# Patient Record
Sex: MALE | Race: WHITE | Employment: OTHER | URBAN - METROPOLITAN AREA
[De-identification: names, ages, dates, MRNs, and addresses within clinical notes are randomized per-mention and may not be internally consistent; named-entity substitution may affect disease eponyms.]

---

## 2017-12-22 PROBLEM — I46.9 ASYSTOLE (HCC): Status: ACTIVE | Noted: 2017-12-22

## 2017-12-23 PROBLEM — I25.10 CAD (CORONARY ARTERY DISEASE): Status: ACTIVE | Noted: 2017-12-23

## 2019-02-08 ENCOUNTER — HOSPITAL ENCOUNTER (OUTPATIENT)
Dept: CARDIAC REHAB | Age: 74
Setting detail: THERAPIES SERIES
Discharge: HOME | End: 2019-02-08
Payer: OTHER GOVERNMENT

## 2019-02-25 ENCOUNTER — HOSPITAL ENCOUNTER (OUTPATIENT)
Dept: CARDIAC REHAB | Age: 74
Setting detail: THERAPIES SERIES
Discharge: HOME | End: 2019-02-25
Payer: OTHER GOVERNMENT

## 2019-02-25 LAB
GLUCOSE BLD-MCNC: 171 MG/DL (ref 70–99)
PERFORMED ON: ABNORMAL

## 2019-02-25 PROCEDURE — 93798 PHYS/QHP OP CAR RHAB W/ECG: CPT

## 2019-02-25 RX ORDER — WARFARIN SODIUM 5 MG/1
5 TABLET ORAL
COMMUNITY
End: 2019-12-30 | Stop reason: ALTCHOICE

## 2019-02-27 ENCOUNTER — HOSPITAL ENCOUNTER (OUTPATIENT)
Dept: CARDIAC REHAB | Age: 74
Setting detail: THERAPIES SERIES
Discharge: HOME | End: 2019-02-27
Payer: OTHER GOVERNMENT

## 2019-02-27 PROCEDURE — 93798 PHYS/QHP OP CAR RHAB W/ECG: CPT

## 2019-03-01 ENCOUNTER — HOSPITAL ENCOUNTER (OUTPATIENT)
Dept: CARDIAC REHAB | Age: 74
Setting detail: THERAPIES SERIES
Discharge: HOME | End: 2019-03-01
Payer: OTHER GOVERNMENT

## 2019-03-01 PROCEDURE — 93798 PHYS/QHP OP CAR RHAB W/ECG: CPT

## 2019-03-04 ENCOUNTER — HOSPITAL ENCOUNTER (OUTPATIENT)
Dept: CARDIAC REHAB | Age: 74
Setting detail: THERAPIES SERIES
Discharge: HOME | End: 2019-03-04
Payer: OTHER GOVERNMENT

## 2019-03-08 ENCOUNTER — HOSPITAL ENCOUNTER (OUTPATIENT)
Dept: CARDIAC REHAB | Age: 74
Setting detail: THERAPIES SERIES
Discharge: HOME | End: 2019-03-08
Payer: OTHER GOVERNMENT

## 2019-03-08 PROCEDURE — 93798 PHYS/QHP OP CAR RHAB W/ECG: CPT

## 2019-03-11 ENCOUNTER — HOSPITAL ENCOUNTER (OUTPATIENT)
Dept: CARDIAC REHAB | Age: 74
Setting detail: THERAPIES SERIES
Discharge: HOME | End: 2019-03-11
Payer: OTHER GOVERNMENT

## 2019-03-11 LAB
GLUCOSE BLD-MCNC: 162 MG/DL (ref 70–99)
PERFORMED ON: ABNORMAL

## 2019-03-11 PROCEDURE — 93798 PHYS/QHP OP CAR RHAB W/ECG: CPT

## 2019-03-13 ENCOUNTER — HOSPITAL ENCOUNTER (OUTPATIENT)
Dept: CARDIAC REHAB | Age: 74
Setting detail: THERAPIES SERIES
Discharge: HOME | End: 2019-03-13
Payer: OTHER GOVERNMENT

## 2019-03-13 LAB
GLUCOSE BLD-MCNC: 160 MG/DL (ref 70–99)
PERFORMED ON: ABNORMAL

## 2019-03-13 PROCEDURE — 93798 PHYS/QHP OP CAR RHAB W/ECG: CPT

## 2019-03-18 ENCOUNTER — HOSPITAL ENCOUNTER (OUTPATIENT)
Dept: CARDIAC REHAB | Age: 74
Setting detail: THERAPIES SERIES
Discharge: HOME | End: 2019-03-18
Payer: OTHER GOVERNMENT

## 2019-03-18 PROCEDURE — 93798 PHYS/QHP OP CAR RHAB W/ECG: CPT

## 2019-03-20 ENCOUNTER — HOSPITAL ENCOUNTER (OUTPATIENT)
Dept: CARDIAC REHAB | Age: 74
Setting detail: THERAPIES SERIES
Discharge: HOME | End: 2019-03-20
Payer: OTHER GOVERNMENT

## 2019-03-20 PROCEDURE — 93798 PHYS/QHP OP CAR RHAB W/ECG: CPT

## 2019-03-25 ENCOUNTER — APPOINTMENT (OUTPATIENT)
Dept: CARDIAC REHAB | Age: 74
End: 2019-03-25
Payer: OTHER GOVERNMENT

## 2019-03-27 ENCOUNTER — APPOINTMENT (OUTPATIENT)
Dept: CARDIAC REHAB | Age: 74
End: 2019-03-27
Payer: OTHER GOVERNMENT

## 2019-03-29 ENCOUNTER — APPOINTMENT (OUTPATIENT)
Dept: CARDIAC REHAB | Age: 74
End: 2019-03-29
Payer: OTHER GOVERNMENT

## 2019-12-30 ENCOUNTER — HOSPITAL ENCOUNTER (OUTPATIENT)
Dept: CARDIAC REHAB | Age: 74
Setting detail: THERAPIES SERIES
Discharge: HOME | End: 2019-12-30
Payer: OTHER GOVERNMENT

## 2019-12-30 RX ORDER — INSULIN GLARGINE 100 [IU]/ML
60 INJECTION, SOLUTION SUBCUTANEOUS DAILY
COMMUNITY

## 2019-12-30 RX ORDER — CLOPIDOGREL BISULFATE 75 MG/1
75 TABLET ORAL DAILY
COMMUNITY

## 2019-12-30 RX ORDER — FUROSEMIDE 20 MG/1
20 TABLET ORAL DAILY
Status: ON HOLD | COMMUNITY
End: 2020-12-05

## 2019-12-30 RX ORDER — FERROUS SULFATE 325(65) MG
325 TABLET ORAL
COMMUNITY

## 2020-01-03 ENCOUNTER — HOSPITAL ENCOUNTER (OUTPATIENT)
Dept: CARDIAC REHAB | Age: 75
Setting detail: THERAPIES SERIES
Discharge: HOME | End: 2020-01-03
Payer: OTHER GOVERNMENT

## 2020-01-03 LAB
GLUCOSE BLD-MCNC: 254 MG/DL (ref 70–99)
GLUCOSE BLD-MCNC: 306 MG/DL (ref 70–99)
PERFORMED ON: ABNORMAL
PERFORMED ON: ABNORMAL

## 2020-01-03 PROCEDURE — 93798 PHYS/QHP OP CAR RHAB W/ECG: CPT

## 2020-01-06 ENCOUNTER — HOSPITAL ENCOUNTER (OUTPATIENT)
Dept: CARDIAC REHAB | Age: 75
Setting detail: THERAPIES SERIES
Discharge: HOME | End: 2020-01-06
Payer: OTHER GOVERNMENT

## 2020-01-06 LAB
GLUCOSE BLD-MCNC: 236 MG/DL (ref 70–99)
PERFORMED ON: ABNORMAL

## 2020-01-06 PROCEDURE — 93798 PHYS/QHP OP CAR RHAB W/ECG: CPT

## 2020-01-08 ENCOUNTER — APPOINTMENT (OUTPATIENT)
Dept: CARDIAC REHAB | Age: 75
End: 2020-01-08
Payer: OTHER GOVERNMENT

## 2020-01-08 ENCOUNTER — HOSPITAL ENCOUNTER (OUTPATIENT)
Dept: CARDIAC REHAB | Age: 75
Setting detail: THERAPIES SERIES
Discharge: HOME | End: 2020-01-08
Payer: OTHER GOVERNMENT

## 2020-01-08 PROCEDURE — 93798 PHYS/QHP OP CAR RHAB W/ECG: CPT

## 2020-01-10 ENCOUNTER — APPOINTMENT (OUTPATIENT)
Dept: CARDIAC REHAB | Age: 75
End: 2020-01-10
Payer: OTHER GOVERNMENT

## 2020-01-13 ENCOUNTER — APPOINTMENT (OUTPATIENT)
Dept: CARDIAC REHAB | Age: 75
End: 2020-01-13
Payer: OTHER GOVERNMENT

## 2020-01-13 ENCOUNTER — HOSPITAL ENCOUNTER (OUTPATIENT)
Dept: CARDIAC REHAB | Age: 75
Setting detail: THERAPIES SERIES
Discharge: HOME | End: 2020-01-13
Payer: OTHER GOVERNMENT

## 2020-01-13 PROCEDURE — 93798 PHYS/QHP OP CAR RHAB W/ECG: CPT

## 2020-01-14 LAB
GLUCOSE BLD-MCNC: 235 MG/DL (ref 70–99)
PERFORMED ON: ABNORMAL

## 2020-01-15 ENCOUNTER — HOSPITAL ENCOUNTER (OUTPATIENT)
Dept: CARDIAC REHAB | Age: 75
Setting detail: THERAPIES SERIES
Discharge: HOME | End: 2020-01-15
Payer: OTHER GOVERNMENT

## 2020-01-15 ENCOUNTER — APPOINTMENT (OUTPATIENT)
Dept: CARDIAC REHAB | Age: 75
End: 2020-01-15
Payer: OTHER GOVERNMENT

## 2020-01-15 PROCEDURE — 93798 PHYS/QHP OP CAR RHAB W/ECG: CPT

## 2020-01-17 ENCOUNTER — APPOINTMENT (OUTPATIENT)
Dept: CARDIAC REHAB | Age: 75
End: 2020-01-17
Payer: OTHER GOVERNMENT

## 2020-01-17 ENCOUNTER — HOSPITAL ENCOUNTER (OUTPATIENT)
Dept: CARDIAC REHAB | Age: 75
Setting detail: THERAPIES SERIES
Discharge: HOME | End: 2020-01-17
Payer: OTHER GOVERNMENT

## 2020-01-17 PROCEDURE — 93798 PHYS/QHP OP CAR RHAB W/ECG: CPT

## 2020-01-20 ENCOUNTER — HOSPITAL ENCOUNTER (OUTPATIENT)
Dept: CARDIAC REHAB | Age: 75
Setting detail: THERAPIES SERIES
Discharge: HOME | End: 2020-01-20
Payer: OTHER GOVERNMENT

## 2020-01-20 ENCOUNTER — APPOINTMENT (OUTPATIENT)
Dept: CARDIAC REHAB | Age: 75
End: 2020-01-20
Payer: OTHER GOVERNMENT

## 2020-01-20 LAB
GLUCOSE BLD-MCNC: 229 MG/DL (ref 70–99)
GLUCOSE BLD-MCNC: 242 MG/DL (ref 70–99)
PERFORMED ON: ABNORMAL
PERFORMED ON: ABNORMAL

## 2020-01-20 PROCEDURE — 93798 PHYS/QHP OP CAR RHAB W/ECG: CPT

## 2020-01-22 ENCOUNTER — HOSPITAL ENCOUNTER (OUTPATIENT)
Dept: CARDIAC REHAB | Age: 75
Setting detail: THERAPIES SERIES
Discharge: HOME | End: 2020-01-22
Payer: OTHER GOVERNMENT

## 2020-01-22 ENCOUNTER — APPOINTMENT (OUTPATIENT)
Dept: CARDIAC REHAB | Age: 75
End: 2020-01-22
Payer: OTHER GOVERNMENT

## 2020-01-22 PROCEDURE — 93798 PHYS/QHP OP CAR RHAB W/ECG: CPT

## 2020-01-24 ENCOUNTER — APPOINTMENT (OUTPATIENT)
Dept: CARDIAC REHAB | Age: 75
End: 2020-01-24
Payer: OTHER GOVERNMENT

## 2020-01-24 ENCOUNTER — HOSPITAL ENCOUNTER (OUTPATIENT)
Dept: CARDIAC REHAB | Age: 75
Setting detail: THERAPIES SERIES
Discharge: HOME | End: 2020-01-24
Payer: OTHER GOVERNMENT

## 2020-01-24 PROCEDURE — 93798 PHYS/QHP OP CAR RHAB W/ECG: CPT

## 2020-01-27 ENCOUNTER — APPOINTMENT (OUTPATIENT)
Dept: CARDIAC REHAB | Age: 75
End: 2020-01-27
Payer: OTHER GOVERNMENT

## 2020-01-27 ENCOUNTER — HOSPITAL ENCOUNTER (OUTPATIENT)
Dept: CARDIAC REHAB | Age: 75
Setting detail: THERAPIES SERIES
Discharge: HOME | End: 2020-01-27
Payer: OTHER GOVERNMENT

## 2020-01-29 ENCOUNTER — APPOINTMENT (OUTPATIENT)
Dept: CARDIAC REHAB | Age: 75
End: 2020-01-29
Payer: OTHER GOVERNMENT

## 2020-01-31 ENCOUNTER — HOSPITAL ENCOUNTER (OUTPATIENT)
Dept: CARDIAC REHAB | Age: 75
Setting detail: THERAPIES SERIES
Discharge: HOME | End: 2020-01-31
Payer: OTHER GOVERNMENT

## 2020-01-31 ENCOUNTER — APPOINTMENT (OUTPATIENT)
Dept: CARDIAC REHAB | Age: 75
End: 2020-01-31
Payer: OTHER GOVERNMENT

## 2020-01-31 PROCEDURE — 93798 PHYS/QHP OP CAR RHAB W/ECG: CPT

## 2020-02-03 ENCOUNTER — APPOINTMENT (OUTPATIENT)
Dept: CARDIAC REHAB | Age: 75
End: 2020-02-03
Payer: MEDICARE

## 2020-02-03 ENCOUNTER — HOSPITAL ENCOUNTER (OUTPATIENT)
Dept: CARDIAC REHAB | Age: 75
Setting detail: THERAPIES SERIES
Discharge: HOME | End: 2020-02-03
Payer: OTHER GOVERNMENT

## 2020-02-03 LAB
GLUCOSE BLD-MCNC: 237 MG/DL (ref 70–99)
PERFORMED ON: ABNORMAL

## 2020-02-03 PROCEDURE — 93798 PHYS/QHP OP CAR RHAB W/ECG: CPT

## 2020-02-05 ENCOUNTER — APPOINTMENT (OUTPATIENT)
Dept: CARDIAC REHAB | Age: 75
End: 2020-02-05
Payer: MEDICARE

## 2020-02-07 ENCOUNTER — HOSPITAL ENCOUNTER (OUTPATIENT)
Dept: CARDIAC REHAB | Age: 75
Setting detail: THERAPIES SERIES
Discharge: HOME | End: 2020-02-07
Payer: OTHER GOVERNMENT

## 2020-02-07 ENCOUNTER — APPOINTMENT (OUTPATIENT)
Dept: CARDIAC REHAB | Age: 75
End: 2020-02-07
Payer: MEDICARE

## 2020-02-07 PROCEDURE — 93798 PHYS/QHP OP CAR RHAB W/ECG: CPT

## 2020-02-10 ENCOUNTER — APPOINTMENT (OUTPATIENT)
Dept: CARDIAC REHAB | Age: 75
End: 2020-02-10
Payer: MEDICARE

## 2020-02-12 ENCOUNTER — APPOINTMENT (OUTPATIENT)
Dept: CARDIAC REHAB | Age: 75
End: 2020-02-12
Payer: MEDICARE

## 2020-02-12 ENCOUNTER — HOSPITAL ENCOUNTER (OUTPATIENT)
Dept: CARDIAC REHAB | Age: 75
Setting detail: THERAPIES SERIES
Discharge: HOME | End: 2020-02-12
Payer: OTHER GOVERNMENT

## 2020-02-12 PROCEDURE — 93798 PHYS/QHP OP CAR RHAB W/ECG: CPT

## 2020-02-14 ENCOUNTER — APPOINTMENT (OUTPATIENT)
Dept: CARDIAC REHAB | Age: 75
End: 2020-02-14
Payer: MEDICARE

## 2020-02-17 ENCOUNTER — APPOINTMENT (OUTPATIENT)
Dept: CARDIAC REHAB | Age: 75
End: 2020-02-17
Payer: MEDICARE

## 2020-02-19 ENCOUNTER — APPOINTMENT (OUTPATIENT)
Dept: CARDIAC REHAB | Age: 75
End: 2020-02-19
Payer: MEDICARE

## 2020-02-21 ENCOUNTER — HOSPITAL ENCOUNTER (OUTPATIENT)
Dept: CARDIAC REHAB | Age: 75
Setting detail: THERAPIES SERIES
Discharge: HOME | End: 2020-02-21
Payer: OTHER GOVERNMENT

## 2020-02-21 ENCOUNTER — APPOINTMENT (OUTPATIENT)
Dept: CARDIAC REHAB | Age: 75
End: 2020-02-21
Payer: MEDICARE

## 2020-02-21 PROCEDURE — 93798 PHYS/QHP OP CAR RHAB W/ECG: CPT

## 2020-02-24 ENCOUNTER — APPOINTMENT (OUTPATIENT)
Dept: CARDIAC REHAB | Age: 75
End: 2020-02-24
Payer: MEDICARE

## 2020-02-24 ENCOUNTER — HOSPITAL ENCOUNTER (OUTPATIENT)
Dept: CARDIAC REHAB | Age: 75
Setting detail: THERAPIES SERIES
Discharge: HOME | End: 2020-02-24
Payer: OTHER GOVERNMENT

## 2020-02-24 PROCEDURE — 93798 PHYS/QHP OP CAR RHAB W/ECG: CPT

## 2020-02-26 ENCOUNTER — HOSPITAL ENCOUNTER (OUTPATIENT)
Dept: CARDIAC REHAB | Age: 75
Setting detail: THERAPIES SERIES
Discharge: HOME | End: 2020-02-26
Payer: OTHER GOVERNMENT

## 2020-02-26 ENCOUNTER — APPOINTMENT (OUTPATIENT)
Dept: CARDIAC REHAB | Age: 75
End: 2020-02-26
Payer: MEDICARE

## 2020-02-26 PROCEDURE — 93798 PHYS/QHP OP CAR RHAB W/ECG: CPT

## 2020-02-28 ENCOUNTER — APPOINTMENT (OUTPATIENT)
Dept: CARDIAC REHAB | Age: 75
End: 2020-02-28
Payer: MEDICARE

## 2020-03-02 ENCOUNTER — APPOINTMENT (OUTPATIENT)
Dept: CARDIAC REHAB | Age: 75
End: 2020-03-02
Payer: OTHER GOVERNMENT

## 2020-03-02 ENCOUNTER — HOSPITAL ENCOUNTER (OUTPATIENT)
Dept: CARDIAC REHAB | Age: 75
Setting detail: THERAPIES SERIES
Discharge: HOME | End: 2020-03-02
Payer: MEDICARE

## 2020-03-02 PROCEDURE — 93798 PHYS/QHP OP CAR RHAB W/ECG: CPT

## 2020-03-04 ENCOUNTER — APPOINTMENT (OUTPATIENT)
Dept: CARDIAC REHAB | Age: 75
End: 2020-03-04
Payer: OTHER GOVERNMENT

## 2020-03-04 ENCOUNTER — HOSPITAL ENCOUNTER (OUTPATIENT)
Dept: CARDIAC REHAB | Age: 75
Setting detail: THERAPIES SERIES
Discharge: HOME | End: 2020-03-04
Payer: MEDICARE

## 2020-03-04 PROCEDURE — 93798 PHYS/QHP OP CAR RHAB W/ECG: CPT

## 2020-03-05 LAB
GLUCOSE BLD-MCNC: 182 MG/DL (ref 70–99)
PERFORMED ON: ABNORMAL

## 2020-03-06 ENCOUNTER — APPOINTMENT (OUTPATIENT)
Dept: CARDIAC REHAB | Age: 75
End: 2020-03-06
Payer: OTHER GOVERNMENT

## 2020-03-06 ENCOUNTER — HOSPITAL ENCOUNTER (OUTPATIENT)
Dept: CARDIAC REHAB | Age: 75
Setting detail: THERAPIES SERIES
Discharge: HOME | End: 2020-03-06
Payer: MEDICARE

## 2020-03-09 ENCOUNTER — APPOINTMENT (OUTPATIENT)
Dept: CARDIAC REHAB | Age: 75
End: 2020-03-09
Payer: OTHER GOVERNMENT

## 2020-03-11 ENCOUNTER — APPOINTMENT (OUTPATIENT)
Dept: CARDIAC REHAB | Age: 75
End: 2020-03-11
Payer: OTHER GOVERNMENT

## 2020-03-13 ENCOUNTER — APPOINTMENT (OUTPATIENT)
Dept: CARDIAC REHAB | Age: 75
End: 2020-03-13
Payer: OTHER GOVERNMENT

## 2020-03-13 ENCOUNTER — HOSPITAL ENCOUNTER (OUTPATIENT)
Dept: CARDIAC REHAB | Age: 75
Setting detail: THERAPIES SERIES
Discharge: HOME | End: 2020-03-13
Payer: MEDICARE

## 2020-03-16 ENCOUNTER — HOSPITAL ENCOUNTER (OUTPATIENT)
Dept: CARDIAC REHAB | Age: 75
Setting detail: THERAPIES SERIES
Discharge: HOME | End: 2020-03-16
Payer: MEDICARE

## 2020-03-16 ENCOUNTER — APPOINTMENT (OUTPATIENT)
Dept: CARDIAC REHAB | Age: 75
End: 2020-03-16
Payer: OTHER GOVERNMENT

## 2020-03-18 ENCOUNTER — APPOINTMENT (OUTPATIENT)
Dept: CARDIAC REHAB | Age: 75
End: 2020-03-18
Payer: OTHER GOVERNMENT

## 2020-03-20 ENCOUNTER — APPOINTMENT (OUTPATIENT)
Dept: CARDIAC REHAB | Age: 75
End: 2020-03-20
Payer: MEDICARE

## 2020-03-20 ENCOUNTER — APPOINTMENT (OUTPATIENT)
Dept: CARDIAC REHAB | Age: 75
End: 2020-03-20
Payer: OTHER GOVERNMENT

## 2020-03-23 ENCOUNTER — APPOINTMENT (OUTPATIENT)
Dept: CARDIAC REHAB | Age: 75
End: 2020-03-23
Payer: OTHER GOVERNMENT

## 2020-03-23 ENCOUNTER — APPOINTMENT (OUTPATIENT)
Dept: CARDIAC REHAB | Age: 75
End: 2020-03-23
Payer: MEDICARE

## 2020-03-25 ENCOUNTER — APPOINTMENT (OUTPATIENT)
Dept: CARDIAC REHAB | Age: 75
End: 2020-03-25
Payer: MEDICARE

## 2020-03-25 ENCOUNTER — APPOINTMENT (OUTPATIENT)
Dept: CARDIAC REHAB | Age: 75
End: 2020-03-25
Payer: OTHER GOVERNMENT

## 2020-03-27 ENCOUNTER — APPOINTMENT (OUTPATIENT)
Dept: CARDIAC REHAB | Age: 75
End: 2020-03-27
Payer: OTHER GOVERNMENT

## 2020-03-27 ENCOUNTER — APPOINTMENT (OUTPATIENT)
Dept: CARDIAC REHAB | Age: 75
End: 2020-03-27
Payer: MEDICARE

## 2020-03-30 ENCOUNTER — APPOINTMENT (OUTPATIENT)
Dept: CARDIAC REHAB | Age: 75
End: 2020-03-30
Payer: MEDICARE

## 2020-03-30 ENCOUNTER — APPOINTMENT (OUTPATIENT)
Dept: CARDIAC REHAB | Age: 75
End: 2020-03-30
Payer: OTHER GOVERNMENT

## 2020-12-05 ENCOUNTER — APPOINTMENT (OUTPATIENT)
Dept: GENERAL RADIOLOGY | Age: 75
DRG: 251 | End: 2020-12-05
Payer: OTHER GOVERNMENT

## 2020-12-05 ENCOUNTER — HOSPITAL ENCOUNTER (INPATIENT)
Age: 75
LOS: 3 days | Discharge: HOME | DRG: 251 | End: 2020-12-08
Attending: EMERGENCY MEDICINE | Admitting: HOSPITALIST
Payer: OTHER GOVERNMENT

## 2020-12-05 PROBLEM — I21.4 NSTEMI (NON-ST ELEVATED MYOCARDIAL INFARCTION) (HCC): Status: ACTIVE | Noted: 2020-12-05

## 2020-12-05 LAB
A/G RATIO: 1.3 (ref 1.1–2.2)
ALBUMIN SERPL-MCNC: 3.9 G/DL (ref 3.4–5)
ALP BLD-CCNC: 99 U/L (ref 40–129)
ALT SERPL-CCNC: 14 U/L (ref 10–40)
ANION GAP SERPL CALCULATED.3IONS-SCNC: 10 MMOL/L (ref 3–16)
APTT: 39.9 SEC (ref 24.2–36.2)
APTT: 49.3 SEC (ref 24.2–36.2)
APTT: 74.4 SEC (ref 24.2–36.2)
AST SERPL-CCNC: 12 U/L (ref 15–37)
BASOPHILS ABSOLUTE: 0 K/UL (ref 0–0.2)
BASOPHILS RELATIVE PERCENT: 0.3 %
BILIRUB SERPL-MCNC: <0.2 MG/DL (ref 0–1)
BUN BLDV-MCNC: 26 MG/DL (ref 7–20)
CALCIUM SERPL-MCNC: 8.9 MG/DL (ref 8.3–10.6)
CHLORIDE BLD-SCNC: 100 MMOL/L (ref 99–110)
CO2: 25 MMOL/L (ref 21–32)
CREAT SERPL-MCNC: 1.1 MG/DL (ref 0.8–1.3)
EKG ATRIAL RATE: 66 BPM
EKG DIAGNOSIS: NORMAL
EKG P AXIS: 27 DEGREES
EKG P-R INTERVAL: 202 MS
EKG Q-T INTERVAL: 480 MS
EKG QRS DURATION: 194 MS
EKG QTC CALCULATION (BAZETT): 503 MS
EKG R AXIS: 59 DEGREES
EKG T AXIS: 226 DEGREES
EKG VENTRICULAR RATE: 66 BPM
EOSINOPHILS ABSOLUTE: 0.1 K/UL (ref 0–0.6)
EOSINOPHILS RELATIVE PERCENT: 0.5 %
ESTIMATED AVERAGE GLUCOSE: 134.1 MG/DL
GFR AFRICAN AMERICAN: >60
GFR NON-AFRICAN AMERICAN: >60
GLOBULIN: 2.9 G/DL
GLUCOSE BLD-MCNC: 110 MG/DL (ref 70–99)
GLUCOSE BLD-MCNC: 146 MG/DL (ref 70–99)
GLUCOSE BLD-MCNC: 161 MG/DL (ref 70–99)
GLUCOSE BLD-MCNC: 170 MG/DL (ref 70–99)
GLUCOSE BLD-MCNC: 187 MG/DL (ref 70–99)
HBA1C MFR BLD: 6.3 %
HCT VFR BLD CALC: 43 % (ref 40.5–52.5)
HCT VFR BLD CALC: 45.6 % (ref 40.5–52.5)
HEMOGLOBIN: 14.3 G/DL (ref 13.5–17.5)
HEMOGLOBIN: 14.8 G/DL (ref 13.5–17.5)
LYMPHOCYTES ABSOLUTE: 2 K/UL (ref 1–5.1)
LYMPHOCYTES RELATIVE PERCENT: 16.9 %
MCH RBC QN AUTO: 31.2 PG (ref 26–34)
MCH RBC QN AUTO: 31.4 PG (ref 26–34)
MCHC RBC AUTO-ENTMCNC: 32.4 G/DL (ref 31–36)
MCHC RBC AUTO-ENTMCNC: 33.3 G/DL (ref 31–36)
MCV RBC AUTO: 94.1 FL (ref 80–100)
MCV RBC AUTO: 96.2 FL (ref 80–100)
MONOCYTES ABSOLUTE: 0.6 K/UL (ref 0–1.3)
MONOCYTES RELATIVE PERCENT: 5 %
NEUTROPHILS ABSOLUTE: 9.3 K/UL (ref 1.7–7.7)
NEUTROPHILS RELATIVE PERCENT: 77.3 %
PDW BLD-RTO: 15.8 % (ref 12.4–15.4)
PDW BLD-RTO: 16.4 % (ref 12.4–15.4)
PERFORMED ON: ABNORMAL
PLATELET # BLD: 185 K/UL (ref 135–450)
PLATELET # BLD: 203 K/UL (ref 135–450)
PMV BLD AUTO: 6.4 FL (ref 5–10.5)
PMV BLD AUTO: 6.5 FL (ref 5–10.5)
POTASSIUM SERPL-SCNC: 4.8 MMOL/L (ref 3.5–5.1)
RBC # BLD: 4.57 M/UL (ref 4.2–5.9)
RBC # BLD: 4.74 M/UL (ref 4.2–5.9)
SARS-COV-2, NAAT: NOT DETECTED
SODIUM BLD-SCNC: 135 MMOL/L (ref 136–145)
TOTAL PROTEIN: 6.8 G/DL (ref 6.4–8.2)
TROPONIN: 0.02 NG/ML
TROPONIN: 0.02 NG/ML
TROPONIN: 0.05 NG/ML
WBC # BLD: 10.4 K/UL (ref 4–11)
WBC # BLD: 12.1 K/UL (ref 4–11)

## 2020-12-05 PROCEDURE — U0002 COVID-19 LAB TEST NON-CDC: HCPCS

## 2020-12-05 PROCEDURE — 97116 GAIT TRAINING THERAPY: CPT

## 2020-12-05 PROCEDURE — 93005 ELECTROCARDIOGRAM TRACING: CPT | Performed by: EMERGENCY MEDICINE

## 2020-12-05 PROCEDURE — 99223 1ST HOSP IP/OBS HIGH 75: CPT | Performed by: INTERNAL MEDICINE

## 2020-12-05 PROCEDURE — 80053 COMPREHEN METABOLIC PANEL: CPT

## 2020-12-05 PROCEDURE — 2500000003 HC RX 250 WO HCPCS: Performed by: EMERGENCY MEDICINE

## 2020-12-05 PROCEDURE — 97530 THERAPEUTIC ACTIVITIES: CPT

## 2020-12-05 PROCEDURE — 85025 COMPLETE CBC W/AUTO DIFF WBC: CPT

## 2020-12-05 PROCEDURE — 6370000000 HC RX 637 (ALT 250 FOR IP): Performed by: INTERNAL MEDICINE

## 2020-12-05 PROCEDURE — 71045 X-RAY EXAM CHEST 1 VIEW: CPT

## 2020-12-05 PROCEDURE — 85730 THROMBOPLASTIN TIME PARTIAL: CPT

## 2020-12-05 PROCEDURE — 2060000000 HC ICU INTERMEDIATE R&B

## 2020-12-05 PROCEDURE — 84484 ASSAY OF TROPONIN QUANT: CPT

## 2020-12-05 PROCEDURE — 6370000000 HC RX 637 (ALT 250 FOR IP): Performed by: NURSE PRACTITIONER

## 2020-12-05 PROCEDURE — 6360000002 HC RX W HCPCS: Performed by: EMERGENCY MEDICINE

## 2020-12-05 PROCEDURE — 99284 EMERGENCY DEPT VISIT MOD MDM: CPT

## 2020-12-05 PROCEDURE — 6370000000 HC RX 637 (ALT 250 FOR IP): Performed by: EMERGENCY MEDICINE

## 2020-12-05 PROCEDURE — 6370000000 HC RX 637 (ALT 250 FOR IP): Performed by: HOSPITALIST

## 2020-12-05 PROCEDURE — 97162 PT EVAL MOD COMPLEX 30 MIN: CPT

## 2020-12-05 PROCEDURE — 93010 ELECTROCARDIOGRAM REPORT: CPT | Performed by: INTERNAL MEDICINE

## 2020-12-05 PROCEDURE — 36415 COLL VENOUS BLD VENIPUNCTURE: CPT

## 2020-12-05 PROCEDURE — 83036 HEMOGLOBIN GLYCOSYLATED A1C: CPT

## 2020-12-05 PROCEDURE — 94760 N-INVAS EAR/PLS OXIMETRY 1: CPT

## 2020-12-05 PROCEDURE — 85027 COMPLETE CBC AUTOMATED: CPT

## 2020-12-05 PROCEDURE — 96374 THER/PROPH/DIAG INJ IV PUSH: CPT

## 2020-12-05 RX ORDER — CLOPIDOGREL BISULFATE 75 MG/1
300 TABLET ORAL ONCE
Status: COMPLETED | OUTPATIENT
Start: 2020-12-05 | End: 2020-12-05

## 2020-12-05 RX ORDER — ISOSORBIDE DINITRATE 20 MG/1
60 TABLET ORAL 2 TIMES DAILY
Status: DISCONTINUED | OUTPATIENT
Start: 2020-12-05 | End: 2020-12-05

## 2020-12-05 RX ORDER — SODIUM CHLORIDE 0.9 % (FLUSH) 0.9 %
10 SYRINGE (ML) INJECTION PRN
Status: DISCONTINUED | OUTPATIENT
Start: 2020-12-05 | End: 2020-12-07 | Stop reason: SDUPTHER

## 2020-12-05 RX ORDER — DEXTROSE MONOHYDRATE 50 MG/ML
100 INJECTION, SOLUTION INTRAVENOUS PRN
Status: DISCONTINUED | OUTPATIENT
Start: 2020-12-05 | End: 2020-12-08 | Stop reason: HOSPADM

## 2020-12-05 RX ORDER — HEPARIN SODIUM 1000 [USP'U]/ML
30 INJECTION, SOLUTION INTRAVENOUS; SUBCUTANEOUS PRN
Status: DISCONTINUED | OUTPATIENT
Start: 2020-12-05 | End: 2020-12-05 | Stop reason: SDUPTHER

## 2020-12-05 RX ORDER — FUROSEMIDE 20 MG/1
20 TABLET ORAL DAILY
Status: DISCONTINUED | OUTPATIENT
Start: 2020-12-05 | End: 2020-12-08 | Stop reason: HOSPADM

## 2020-12-05 RX ORDER — LISINOPRIL 40 MG/1
40 TABLET ORAL DAILY
Status: DISCONTINUED | OUTPATIENT
Start: 2020-12-05 | End: 2020-12-08 | Stop reason: HOSPADM

## 2020-12-05 RX ORDER — CLONIDINE HYDROCHLORIDE 0.1 MG/1
0.2 TABLET ORAL 3 TIMES DAILY
Status: DISCONTINUED | OUTPATIENT
Start: 2020-12-05 | End: 2020-12-05

## 2020-12-05 RX ORDER — HEPARIN SODIUM 10000 [USP'U]/100ML
12 INJECTION, SOLUTION INTRAVENOUS CONTINUOUS
Status: DISCONTINUED | OUTPATIENT
Start: 2020-12-05 | End: 2020-12-05

## 2020-12-05 RX ORDER — NICOTINE POLACRILEX 4 MG
15 LOZENGE BUCCAL PRN
Status: DISCONTINUED | OUTPATIENT
Start: 2020-12-05 | End: 2020-12-08 | Stop reason: HOSPADM

## 2020-12-05 RX ORDER — GLIPIZIDE 5 MG/1
5 TABLET ORAL
COMMUNITY

## 2020-12-05 RX ORDER — TAMSULOSIN HYDROCHLORIDE 0.4 MG/1
0.4 CAPSULE ORAL NIGHTLY
Status: DISCONTINUED | OUTPATIENT
Start: 2020-12-05 | End: 2020-12-08 | Stop reason: HOSPADM

## 2020-12-05 RX ORDER — FLUTICASONE PROPIONATE 50 MCG
1 SPRAY, SUSPENSION (ML) NASAL DAILY PRN
Status: DISCONTINUED | OUTPATIENT
Start: 2020-12-05 | End: 2020-12-08 | Stop reason: HOSPADM

## 2020-12-05 RX ORDER — SODIUM CHLORIDE 0.9 % (FLUSH) 0.9 %
10 SYRINGE (ML) INJECTION EVERY 12 HOURS SCHEDULED
Status: DISCONTINUED | OUTPATIENT
Start: 2020-12-05 | End: 2020-12-07 | Stop reason: SDUPTHER

## 2020-12-05 RX ORDER — ISOSORBIDE MONONITRATE 60 MG/1
120 TABLET, EXTENDED RELEASE ORAL DAILY
Status: DISCONTINUED | OUTPATIENT
Start: 2020-12-05 | End: 2020-12-07 | Stop reason: SDUPTHER

## 2020-12-05 RX ORDER — ATORVASTATIN CALCIUM 80 MG/1
80 TABLET, FILM COATED ORAL NIGHTLY
Status: DISCONTINUED | OUTPATIENT
Start: 2020-12-05 | End: 2020-12-08 | Stop reason: HOSPADM

## 2020-12-05 RX ORDER — METOPROLOL SUCCINATE 50 MG/1
200 TABLET, EXTENDED RELEASE ORAL DAILY
Status: DISCONTINUED | OUTPATIENT
Start: 2020-12-05 | End: 2020-12-08 | Stop reason: HOSPADM

## 2020-12-05 RX ORDER — ACETAMINOPHEN 650 MG/1
650 SUPPOSITORY RECTAL EVERY 6 HOURS PRN
Status: DISCONTINUED | OUTPATIENT
Start: 2020-12-05 | End: 2020-12-08 | Stop reason: HOSPADM

## 2020-12-05 RX ORDER — POLYETHYLENE GLYCOL 3350 17 G/17G
17 POWDER, FOR SOLUTION ORAL DAILY PRN
Status: DISCONTINUED | OUTPATIENT
Start: 2020-12-05 | End: 2020-12-08 | Stop reason: HOSPADM

## 2020-12-05 RX ORDER — HEPARIN SODIUM 1000 [USP'U]/ML
30 INJECTION, SOLUTION INTRAVENOUS; SUBCUTANEOUS PRN
Status: DISCONTINUED | OUTPATIENT
Start: 2020-12-05 | End: 2020-12-05

## 2020-12-05 RX ORDER — ACETAMINOPHEN 325 MG/1
650 TABLET ORAL EVERY 6 HOURS PRN
Status: DISCONTINUED | OUTPATIENT
Start: 2020-12-05 | End: 2020-12-08 | Stop reason: HOSPADM

## 2020-12-05 RX ORDER — HEPARIN SODIUM 1000 [USP'U]/ML
60 INJECTION, SOLUTION INTRAVENOUS; SUBCUTANEOUS PRN
Status: DISCONTINUED | OUTPATIENT
Start: 2020-12-05 | End: 2020-12-05

## 2020-12-05 RX ORDER — HEPARIN SODIUM 1000 [USP'U]/ML
60 INJECTION, SOLUTION INTRAVENOUS; SUBCUTANEOUS ONCE
Status: DISCONTINUED | OUTPATIENT
Start: 2020-12-05 | End: 2020-12-05

## 2020-12-05 RX ORDER — PROMETHAZINE HYDROCHLORIDE 25 MG/1
12.5 TABLET ORAL EVERY 6 HOURS PRN
Status: DISCONTINUED | OUTPATIENT
Start: 2020-12-05 | End: 2020-12-08 | Stop reason: HOSPADM

## 2020-12-05 RX ORDER — POLYVINYL ALCOHOL 14 MG/ML
1 SOLUTION/ DROPS OPHTHALMIC 3 TIMES DAILY PRN
Status: DISCONTINUED | OUTPATIENT
Start: 2020-12-05 | End: 2020-12-08 | Stop reason: HOSPADM

## 2020-12-05 RX ORDER — ASPIRIN 81 MG/1
81 TABLET, CHEWABLE ORAL DAILY
Status: DISCONTINUED | OUTPATIENT
Start: 2020-12-05 | End: 2020-12-08 | Stop reason: HOSPADM

## 2020-12-05 RX ORDER — INSULIN GLARGINE 100 [IU]/ML
55 INJECTION, SOLUTION SUBCUTANEOUS DAILY
Status: DISCONTINUED | OUTPATIENT
Start: 2020-12-05 | End: 2020-12-08 | Stop reason: HOSPADM

## 2020-12-05 RX ORDER — CLOPIDOGREL BISULFATE 75 MG/1
75 TABLET ORAL DAILY
Status: DISCONTINUED | OUTPATIENT
Start: 2020-12-06 | End: 2020-12-08 | Stop reason: HOSPADM

## 2020-12-05 RX ORDER — ASPIRIN 81 MG/1
324 TABLET, CHEWABLE ORAL ONCE
Status: COMPLETED | OUTPATIENT
Start: 2020-12-05 | End: 2020-12-05

## 2020-12-05 RX ORDER — CLONIDINE HYDROCHLORIDE 0.1 MG/1
0.1 TABLET ORAL 3 TIMES DAILY
Status: DISCONTINUED | OUTPATIENT
Start: 2020-12-05 | End: 2020-12-08 | Stop reason: HOSPADM

## 2020-12-05 RX ORDER — RANOLAZINE 500 MG/1
1000 TABLET, EXTENDED RELEASE ORAL 2 TIMES DAILY
Status: DISCONTINUED | OUTPATIENT
Start: 2020-12-05 | End: 2020-12-08 | Stop reason: HOSPADM

## 2020-12-05 RX ORDER — GABAPENTIN 300 MG/1
600 CAPSULE ORAL 3 TIMES DAILY
Status: DISCONTINUED | OUTPATIENT
Start: 2020-12-05 | End: 2020-12-08 | Stop reason: HOSPADM

## 2020-12-05 RX ORDER — HEPARIN SODIUM 10000 [USP'U]/100ML
10 INJECTION, SOLUTION INTRAVENOUS CONTINUOUS
Status: DISCONTINUED | OUTPATIENT
Start: 2020-12-05 | End: 2020-12-07

## 2020-12-05 RX ORDER — HYDRALAZINE HYDROCHLORIDE 50 MG/1
50 TABLET, FILM COATED ORAL 2 TIMES DAILY
Status: DISCONTINUED | OUTPATIENT
Start: 2020-12-05 | End: 2020-12-08 | Stop reason: HOSPADM

## 2020-12-05 RX ORDER — PANTOPRAZOLE SODIUM 40 MG/1
40 TABLET, DELAYED RELEASE ORAL DAILY
Status: DISCONTINUED | OUTPATIENT
Start: 2020-12-05 | End: 2020-12-08 | Stop reason: HOSPADM

## 2020-12-05 RX ORDER — HEPARIN SODIUM 1000 [USP'U]/ML
4000 INJECTION, SOLUTION INTRAVENOUS; SUBCUTANEOUS ONCE
Status: COMPLETED | OUTPATIENT
Start: 2020-12-05 | End: 2020-12-05

## 2020-12-05 RX ORDER — DEXTROSE MONOHYDRATE 25 G/50ML
12.5 INJECTION, SOLUTION INTRAVENOUS PRN
Status: DISCONTINUED | OUTPATIENT
Start: 2020-12-05 | End: 2020-12-08 | Stop reason: HOSPADM

## 2020-12-05 RX ORDER — FLUTICASONE PROPIONATE 50 MCG
1 SPRAY, SUSPENSION (ML) NASAL DAILY
Status: DISCONTINUED | OUTPATIENT
Start: 2020-12-05 | End: 2020-12-05

## 2020-12-05 RX ORDER — CETIRIZINE HYDROCHLORIDE 10 MG/1
10 TABLET ORAL DAILY
Status: DISCONTINUED | OUTPATIENT
Start: 2020-12-05 | End: 2020-12-05

## 2020-12-05 RX ORDER — ONDANSETRON 2 MG/ML
4 INJECTION INTRAMUSCULAR; INTRAVENOUS EVERY 6 HOURS PRN
Status: DISCONTINUED | OUTPATIENT
Start: 2020-12-05 | End: 2020-12-08 | Stop reason: HOSPADM

## 2020-12-05 RX ORDER — ISOSORBIDE MONONITRATE 60 MG/1
120 TABLET, EXTENDED RELEASE ORAL DAILY
COMMUNITY

## 2020-12-05 RX ORDER — POLYVINYL ALCOHOL 14 MG/ML
1 SOLUTION/ DROPS OPHTHALMIC 3 TIMES DAILY
Status: DISCONTINUED | OUTPATIENT
Start: 2020-12-05 | End: 2020-12-05

## 2020-12-05 RX ADMIN — NITROGLYCERIN 0.5 INCH: 20 OINTMENT TOPICAL at 23:31

## 2020-12-05 RX ADMIN — HYDRALAZINE HYDROCHLORIDE 50 MG: 50 TABLET, FILM COATED ORAL at 14:22

## 2020-12-05 RX ADMIN — ASPIRIN 324 MG: 81 TABLET, CHEWABLE ORAL at 08:08

## 2020-12-05 RX ADMIN — GABAPENTIN 600 MG: 300 CAPSULE ORAL at 21:25

## 2020-12-05 RX ADMIN — INSULIN LISPRO 1 UNITS: 100 INJECTION, SOLUTION INTRAVENOUS; SUBCUTANEOUS at 17:38

## 2020-12-05 RX ADMIN — ATORVASTATIN CALCIUM 80 MG: 80 TABLET, FILM COATED ORAL at 21:25

## 2020-12-05 RX ADMIN — LISINOPRIL 40 MG: 40 TABLET ORAL at 14:21

## 2020-12-05 RX ADMIN — NITROGLYCERIN 1 INCH: 20 OINTMENT TOPICAL at 08:25

## 2020-12-05 RX ADMIN — FUROSEMIDE 20 MG: 20 TABLET ORAL at 14:21

## 2020-12-05 RX ADMIN — CLONIDINE HYDROCHLORIDE 0.2 MG: 0.1 TABLET ORAL at 14:21

## 2020-12-05 RX ADMIN — TAMSULOSIN HYDROCHLORIDE 0.4 MG: 0.4 CAPSULE ORAL at 21:25

## 2020-12-05 RX ADMIN — RANOLAZINE 1000 MG: 500 TABLET, FILM COATED, EXTENDED RELEASE ORAL at 21:25

## 2020-12-05 RX ADMIN — ISOSORBIDE MONONITRATE 120 MG: 60 TABLET, EXTENDED RELEASE ORAL at 14:40

## 2020-12-05 RX ADMIN — ASPIRIN 81 MG: 81 TABLET, CHEWABLE ORAL at 14:40

## 2020-12-05 RX ADMIN — INSULIN LISPRO 1 UNITS: 100 INJECTION, SOLUTION INTRAVENOUS; SUBCUTANEOUS at 21:25

## 2020-12-05 RX ADMIN — GABAPENTIN 600 MG: 300 CAPSULE ORAL at 14:21

## 2020-12-05 RX ADMIN — HEPARIN SODIUM 4000 UNITS: 1000 INJECTION INTRAVENOUS; SUBCUTANEOUS at 09:28

## 2020-12-05 RX ADMIN — RANOLAZINE 1000 MG: 500 TABLET, FILM COATED, EXTENDED RELEASE ORAL at 14:21

## 2020-12-05 RX ADMIN — METOPROLOL SUCCINATE 200 MG: 50 TABLET, EXTENDED RELEASE ORAL at 14:21

## 2020-12-05 RX ADMIN — ACETAMINOPHEN 650 MG: 325 TABLET ORAL at 23:30

## 2020-12-05 RX ADMIN — HEPARIN SODIUM 10 ML/HR: 10000 INJECTION, SOLUTION INTRAVENOUS at 09:30

## 2020-12-05 RX ADMIN — CLOPIDOGREL BISULFATE 300 MG: 75 TABLET ORAL at 09:27

## 2020-12-05 RX ADMIN — NITROGLYCERIN 0.5 INCH: 20 OINTMENT TOPICAL at 17:38

## 2020-12-05 RX ADMIN — CLONIDINE HYDROCHLORIDE 0.1 MG: 0.1 TABLET ORAL at 23:30

## 2020-12-05 ASSESSMENT — PAIN SCALES - GENERAL
PAINLEVEL_OUTOF10: 0
PAINLEVEL_OUTOF10: 3
PAINLEVEL_OUTOF10: 0
PAINLEVEL_OUTOF10: 0
PAINLEVEL_OUTOF10: 3

## 2020-12-05 ASSESSMENT — PAIN - FUNCTIONAL ASSESSMENT: PAIN_FUNCTIONAL_ASSESSMENT: ACTIVITIES ARE NOT PREVENTED

## 2020-12-05 ASSESSMENT — PAIN DESCRIPTION - PROGRESSION: CLINICAL_PROGRESSION: GRADUALLY WORSENING

## 2020-12-05 ASSESSMENT — PAIN DESCRIPTION - LOCATION: LOCATION: HEAD

## 2020-12-05 ASSESSMENT — PAIN DESCRIPTION - FREQUENCY: FREQUENCY: INTERMITTENT

## 2020-12-05 ASSESSMENT — PAIN DESCRIPTION - ORIENTATION: ORIENTATION: ANTERIOR

## 2020-12-05 ASSESSMENT — PAIN DESCRIPTION - PAIN TYPE: TYPE: ACUTE PAIN

## 2020-12-05 ASSESSMENT — PAIN DESCRIPTION - ONSET: ONSET: GRADUAL

## 2020-12-05 ASSESSMENT — PAIN DESCRIPTION - DESCRIPTORS: DESCRIPTORS: HEADACHE

## 2020-12-05 ASSESSMENT — HEART SCORE: ECG: 1

## 2020-12-05 NOTE — ED PROVIDER NOTES
hematuria  General: No fever or chills  All other systems reviewed and are negative. PAST MEDICAL & SURGICAL HISTORY    Past Medical History:   Diagnosis Date    Arthritis     CAD (coronary artery disease)     Erectile dysfunction     GERD (gastroesophageal reflux disease)     High blood pressure     Hyperlipidemia     Neuropathy     disc diseae of back with sciatica.     Obesity      Past Surgical History:   Procedure Laterality Date    APPENDECTOMY  1976    BACK SURGERY  2009    CARPAL TUNNEL RELEASE      CHOLECYSTECTOMY      CORONARY ANGIOPLASTY WITH STENT PLACEMENT      5 stents    GALLBLADDER SURGERY         CURRENT MEDICATIONS  (may include discharge medications prescribed in the ED)  Current Outpatient Rx   Medication Sig Dispense Refill    clopidogrel (PLAVIX) 75 MG tablet Take 75 mg by mouth daily      ferrous sulfate 325 (65 Fe) MG tablet Take 325 mg by mouth every other day      furosemide (LASIX) 20 MG tablet Take 20 mg by mouth daily      insulin glargine (LANTUS) 100 UNIT/ML injection vial Inject 55 Units into the skin daily      metFORMIN (GLUCOPHAGE-XR) 500 MG extended release tablet Take 1,000 mg by mouth nightly      pantoprazole (PROTONIX) 20 MG tablet Take 40 mg by mouth daily       Atorvastatin Calcium (LIPITOR PO) Take 80 mg by mouth nightly       lisinopril (PRINIVIL;ZESTRIL) 40 MG tablet Take 40 mg by mouth daily      metoprolol succinate (TOPROL XL) 200 MG extended release tablet Take 200 mg by mouth daily      polyvinyl alcohol (LIQUIFILM TEARS) 1.4 % ophthalmic solution Place 1 drop into both eyes 3 times daily      cloNIDine (CATAPRES) 0.2 MG tablet Take 0.2 mg by mouth 3 times daily Pt takes 1/2 tablet      eplerenone (INSPRA) 50 MG tablet Take 50 mg by mouth 2 times daily      fluticasone (FLONASE) 50 MCG/ACT nasal spray 1 spray by Nasal route daily      gabapentin (NEURONTIN) 600 MG tablet Take 600 mg by mouth See Admin Instructions Take 1 tablet twice a day and take 2 tablets at bedtime      Magnesium Oxide 420 MG TABS Take by mouth See Admin Instructions Take 2 tablets every AM and 1 tablet every PM      ranolazine (RANEXA) 500 MG extended release tablet Take 500 mg by mouth 2 times daily      sodium chloride (OCEAN) 0.65 % nasal spray 1 spray by Nasal route as needed for Congestion      tamsulosin (FLOMAX) 0.4 MG capsule Take 0.4 mg by mouth nightly      Cholecalciferol 2000 units TABS Take by mouth daily      loratadine (CLARITIN) 10 MG tablet Take 10 mg by mouth daily as needed      hydrocodone-acetaminophen (VICODIN) 5-500 MG per tablet Take 1 tablet by mouth every 6 hours as needed.         hydrALAZINE (APRESOLINE) 50 MG tablet Take 75 mg by mouth 2 times daily       isosorbide dinitrate (ISORDIL) 30 MG tablet Take 60 mg by mouth Takes 4 tablets daily         ALLERGIES    Allergies   Allergen Reactions    Doxazosin Shortness Of Breath    Norvasc [Amlodipine Besylate]        SOCIAL & FAMILY HISTORY    Social History     Socioeconomic History    Marital status:      Spouse name: None    Number of children: None    Years of education: None    Highest education level: None   Occupational History    None   Social Needs    Financial resource strain: None    Food insecurity     Worry: None     Inability: None    Transportation needs     Medical: None     Non-medical: None   Tobacco Use    Smoking status: Former Smoker     Packs/day: 0.50     Years: 1.00     Pack years: 0.50     Last attempt to quit: 5/3/1970     Years since quittin.6    Smokeless tobacco: Never Used   Substance and Sexual Activity    Alcohol use: No    Drug use: No    Sexual activity: None   Lifestyle    Physical activity     Days per week: None     Minutes per session: None    Stress: None   Relationships    Social connections     Talks on phone: None     Gets together: None     Attends [employer]ism service: None     Active member of club or organization: None Attends meetings of clubs or organizations: None     Relationship status: None    Intimate partner violence     Fear of current or ex partner: None     Emotionally abused: None     Physically abused: None     Forced sexual activity: None   Other Topics Concern    None   Social History Narrative    None     Family History   Problem Relation Age of Onset    Kidney Disease Mother     Diabetes Mother     Heart Failure Father        PHYSICAL EXAM    VITAL SIGNS: BP (!) 140/64   Pulse 69   Temp 97.5 °F (36.4 °C) (Axillary)   Resp 16   Wt 277 lb 9 oz (125.9 kg)   SpO2 96%   BMI 41.59 kg/m²    Constitutional:  Well developed, well nourished, no acute distress   HENT:  Atraumatic, dry mucus membranes  Neck: supple, no JVD   Respiratory:  Lungs clear to auscultation bilaterally, no retractions   Cardiovascular: Regular rhythm and rate, S1-S2. No murmur. Vascular: Radial, pedal and posttibial pulses 2+ equal bilaterally. Brisk capillary refill to fingers and toes. Extremities are warm natural color. Trace ankle edema bilaterally. GI:  Soft, nontender, nondistended abdomen without rebound or guarding. Normoactive bowel sounds throughout auscultation. Musculoskeletal:  no lower extremity asymmetry, no calf tenderness, no thigh tenderness, no acute deformities  Integument:  Skin warm and dry, no petechiae   Neurologic:  Alert & oriented x4, no slurred speech  Psych: Pleasant affect, no hallucinations    EKG   EKG reviewed by myself and interpreted by my attending physician Dr. Kamala Ashton. Ventricular rate 66 bpm, IA interval 202 ms, QRS duration 194 ms,  ms  There is no ST elevation or depression. T wave inversions are noted throughout leads I through aVF and V4 through V6. Interpretation is a normal sinus rhythm with a left bundle branch block which has been noted with previous EKG. Today's tracing was compared to the one on December 22, 2017.   When compared to today's tracing he has new T wave 153/60 (!) 161/70 (!) 143/63 (!) 140/64   Pulse: 65 66 68 69   Resp: 14 16 13 16   Temp:       TempSrc:       SpO2: 96% 98% 95% 96%   Weight:           Medications   clopidogrel (PLAVIX) tablet 300 mg (has no administration in time range)   heparin (porcine) injection 4,000 Units (has no administration in time range)   heparin 25,000 unit in sodium chloride 0.45% 250 mL infusion (has no administration in time range)   aspirin chewable tablet 324 mg (324 mg Oral Given 12/5/20 0808)   nitroglycerin (NITRO-BID) 2 % ointment 1 inch (1 inch Topical Given 12/5/20 0825)     This patient was seen evaluated by myself my attending physician Dr. Leandra Rosen. Differential diagnosis includes was not limited to ACS, congestive heart failure, thoracic aortic dissection, PE, GERD, peptic ulcer disease, other. He is nontoxic in appearance. He is afebrile. Heart rate is normal.  He arrives hypertensive with a blood pressure of 182/76. He has not taken his hypertension medications today. Patient has an extensive cardiac history. He was given an aspirin initially. On arrival to the ED he is chest pressure/pain free. He took a total of 3 sublingual nitroglycerin at home. Cardiac work-up was initiated. He has no anemia. White count is 12. CMP is with a sodium of 135 with a potassium of 4.8. Glucose is 170. BUN is 26 with a creatinine of 1.1. LFTs are unremarkable. Troponin is 0.02. Unable to find previous troponin for comparison. Rapid Covid is negative. Portable chest x-ray interpreted by radiology and reviewed by myself showed no acute cardiopulmonary disease. Patient complained of aching in his chest to Dr. Leandra Rosen. He was placed on Nitropaste. Patient had 2 more EKGs after my initial 1. No changes noted. Dr. Leandra Rosen spoke with cardiology and the patient will be admitted to the hospitalist service.       CRITICAL CARE NOTE:  There was a high probability of clinically significant life-threatening deterioration of the patient's condition requiring my urgent intervention. Total critical care time is 40 minutes. This includes multiple reevaluations, vital sign monitoring, pulse oximetry monitoring, telemetry monitoring, clinical response to the IV medications, reviewing the nursing notes, consultation time, dictation/documentation time, and interpretation of the labwork. (This time excludes time spent performing procedures). FINAL IMPRESSION    1. Chest pain, unspecified type    2. Coronary artery disease involving native heart with unstable angina pectoris, unspecified vessel or lesion type ([facility] [address]    3.  Troponin level elevated        PLAN  Admission to the hospital    (Please note that this note was completed with a voice recognition program.  Every attempt was made to edit the dictations, but inevitably there remain words that are mis-transcribed.)       BRIAN Long - CNP  12/05/20 8554

## 2020-12-05 NOTE — H&P
Hospital Medicine History & Physical      PCP: Dante Nguyen DO    Date of Admission: 12/5/2020    Date of Service: Pt seen/examined on 12/5/2020 and Admitted to Inpatient with expected LOS greater than two midnights due to medical therapy. Chief Complaint:  CP      History Of Present Illness:       70 y.o. male with CAD presents with chest pain at rest this am. He woke up around 3, found to be hypoglycemic, was able to correct after eating however developed L sided chest pain with radiation to L arm. Resolved with ntg sl x1, was able to sleep, but cp recurred shortly after waking around 6am with ntg relieving pain again. After a third episode his wife called 48. Denies n/v/abd pain/ lbm/ sob/cough/fever    Past Medical History:          Diagnosis Date    Arthritis     CAD (coronary artery disease)     Erectile dysfunction     GERD (gastroesophageal reflux disease)     High blood pressure     Hyperlipidemia     Neuropathy     disc diseae of back with sciatica.  Obesity        Past Surgical History:          Procedure Laterality Date    APPENDECTOMY  1976    BACK SURGERY  2009    CARPAL TUNNEL RELEASE      CHOLECYSTECTOMY      CORONARY ANGIOPLASTY WITH STENT PLACEMENT      5 stents    GALLBLADDER SURGERY         Medications Prior to Admission:      Prior to Admission medications    Medication Sig Start Date End Date Taking?  Authorizing Provider   isosorbide mononitrate (IMDUR) 60 MG extended release tablet Take 120 mg by mouth daily   Yes Historical Provider, MD   glipiZIDE (GLUCOTROL) 5 MG tablet Take 5 mg by mouth 2 times daily (before meals)   Yes Historical Provider, MD   apixaban (ELIQUIS) 5 MG TABS tablet Take 5 mg by mouth 2 times daily   Yes Historical Provider, MD   EMPAGLIFLOZIN PO Take 12.5 mg by mouth daily   Yes Historical Provider, MD   clopidogrel (PLAVIX) 75 MG tablet Take 75 mg by mouth daily   Yes Historical Provider, MD   ferrous sulfate 325 (65 Fe) MG tablet Take 325 mg by mouth every other day   Yes Historical Provider, MD   insulin glargine (LANTUS) 100 UNIT/ML injection vial Inject 60 Units into the skin daily    Yes Historical Provider, MD   METFORMIN HCL ER PO Take 2,000 mg by mouth nightly    Yes Historical Provider, MD   pantoprazole (PROTONIX) 40 MG tablet Take 40 mg by mouth daily    Yes Historical Provider, MD   atorvastatin (LIPITOR) 80 MG tablet Take 80 mg by mouth nightly    Yes Historical Provider, MD   lisinopril (PRINIVIL;ZESTRIL) 40 MG tablet Take 40 mg by mouth daily   Yes Historical Provider, MD   metoprolol succinate (TOPROL XL) 200 MG extended release tablet Take 200 mg by mouth daily   Yes Historical Provider, MD   polyvinyl alcohol (LIQUIFILM TEARS) 1.4 % ophthalmic solution Place 1 drop into both eyes 3 times daily   Yes Historical Provider, MD   cloNIDine (CATAPRES) 0.1 MG tablet Take 0.1 mg by mouth 3 times daily    Yes Historical Provider, MD   eplerenone (INSPRA) 50 MG tablet Take 50 mg by mouth 2 times daily   Yes Historical Provider, MD   gabapentin (NEURONTIN) 600 MG tablet Take 600 mg by mouth 3 times daily. Yes Historical Provider, MD   Magnesium Oxide 420 MG TABS Take 2 tablets by mouth 2 times daily    Yes Historical Provider, MD   ranolazine (RANEXA) 1000 MG extended release tablet Take 1,000 mg by mouth 2 times daily    Yes Historical Provider, MD   tamsulosin (FLOMAX) 0.4 MG capsule Take 0.4 mg by mouth nightly   Yes Historical Provider, MD   Cholecalciferol 2000 units TABS Take by mouth daily   Yes Historical Provider, MD   hydrALAZINE (APRESOLINE) 50 MG tablet Take 50 mg by mouth 2 times daily    Yes Historical Provider, MD   fluticasone (FLONASE) 50 MCG/ACT nasal spray 1 spray by Nasal route daily    Historical Provider, MD       Allergies:  Doxazosin and Norvasc [amlodipine besylate]    Social History:           TOBACCO:   reports that he quit smoking about 50 years ago. He has a 0.50 pack-year smoking history.  He has never used smokeless tobacco.  ETOH:   reports no history of alcohol use. Family History:               Problem Relation Age of Onset    Kidney Disease Mother     Diabetes Mother     Heart Failure Father        REVIEW OF SYSTEMS:   Pertinent positives as noted in the HPI. All other systems reviewed and negative. PHYSICAL EXAM PERFORMED:    /78   Pulse 76   Temp 97.8 °F (36.6 °C)   Resp 18   Wt 277 lb 9 oz (125.9 kg)   SpO2 97%   BMI 41.59 kg/m²     General appearance:  No apparent distress, appears stated age and cooperative. HEENT:  Normal cephalic, atraumatic without obvious deformity. Pupils equal, round, and reactive to light. Extra ocular muscles intact. Conjunctivae/corneas clear. Neck: Supple, with full range of motion. No jugular venous distention. Trachea midline. Respiratory:  Normal respiratory effort. Clear to auscultation, bilaterally without Rales/Wheezes/Rhonchi. Cardiovascular:  Regular rate and rhythm with normal S1/S2 without murmurs, rubs or gallops. Abdomen: Soft, non-tender, non-distended with normal bowel sounds. Musculoskeletal:  No clubbing, cyanosis or edema bilaterally. Full range of motion without deformity. Skin: Skin color, texture, turgor normal.  No rashes or lesions. Neurologic:  Neurovascularly intact without any focal sensory/motor deficits. Cranial nerves: II-XII intact, grossly non-focal.  Psychiatric:  Alert and oriented, thought content appropriate, normal insight  Capillary Refill: Brisk,< 3 seconds   Peripheral Pulses: +2 palpable, equal bilaterally       Labs:     Recent Labs     12/05/20  0746 12/05/20  1200   WBC 12.1* 10.4   HGB 14.8 14.3   HCT 45.6 43.0    185     Recent Labs     12/05/20  0747   *   K 4.8      CO2 25   BUN 26*   CREATININE 1.1   CALCIUM 8.9     Recent Labs     12/05/20  0747   AST 12*   ALT 14   BILITOT <0.2   ALKPHOS 99     No results for input(s): INR in the last 72 hours.   Recent Labs     12/05/20  5009 12/05/20  1405   TROPONINI 0.02* 0.02*       Urinalysis:      Lab Results   Component Value Date    BLOODU neg 09/21/2011    SPECGRAV 1.010 09/21/2011    GLUCOSEU neg 09/21/2011       Radiology:          XR CHEST PORTABLE   Final Result   No acute cardiopulmonary disease. ASSESSMENT:    Active Hospital Problems    Diagnosis Date Noted    NSTEMI (non-ST elevated myocardial infarction) ([facility] [address] [I21.4] 12/05/2020         PLAN:    NSTEMI  - hep gtts, dual anti platelets, statin,beta, card consult    HTN  - continuing home meds    DM  - lantus with corrective ISS, no metformin      DVT Prophylaxis: anticoagulated  Diet: DIET CARDIAC; Carb Control: 4 carb choices (60 gms)/meal  Code Status: Full Code         Terell Hackett MD    Thank you [address] [place] [address] for the opportunity to be involved in this patient's care. If you have any questions or concerns please feel free to contact me at [phone].

## 2020-12-05 NOTE — PROGRESS NOTES
Clinical Pharmacy Note  Heparin Dosing       Lab Results   Component Value Date    APTT 74.4 12/05/2020     Lab Results   Component Value Date    HGB 14.3 12/05/2020    HCT 43.0 12/05/2020     12/05/2020       Current Infusion Rate: 10 mL/hr    Plan:  Drawn > 3 hours early. Continue same rate: 10 mL/hr  Next aPTT: 12/05/20 1800    Pharmacy will continue to monitor and adjust based on aPTT results.   Chance Prather, [address] [facility]

## 2020-12-05 NOTE — ED NOTES
Bed: [facility]  Expected date: 12/5/20  Expected time:   Means of arrival: [facility]  Comments:  Chest pain       Kirby Baker RN  12/05/20 0107

## 2020-12-05 NOTE — ED TRIAGE NOTES
Pt arrived via squad from home where he noticed chest pressure that did radiate into the right side of his neck. He states that he does have a cardiac history and stated however it did feel different than when he needed stents in placed. He states that he took a NTG at 0400 in which it subsided until about 0600. He then took another NTG. He states it didn't go away so he took a third and final NTG at 0615. When EMS arrived he stated that his Chest discomfort had subsided. He stated that when he did noticed the chest discomfort \"it was hard to breath\" but unsure if he was short of breath. He states that he is usually a patient of the [place] and his physicians and medical records are there.     He did not take ASA stated that the [place]

## 2020-12-05 NOTE — CONSULTS
Aris 51  1945 December 5, 2020    Reason for Consult: Chest Pain    CC: Chest Pain    HPI:  The patient is 70 y.o. male with a past medical history significant for prior permanent pacemaker placement (for syncope and heart block), obesity, essential hypertension, hyperlipidemia and CAD with prior CABG (2015) and recent PCI who presented to [facility] with chest pain. I was asked to see him for this given concerns of unstable angina. He was found to have 2 of 3 occluded bypass grafts recently and had PCI to his native vessels in November 2020. He had been having chest pain then leading to the more recent angiogram. He had relief of the chest pain with that intervention. He states that this morning he woke up around 3am and felt 'nervous and shaky'. He was hypoglycemic on his glucose check. He at something but developed pain in his chest shortly thereafter. It was like a heaviness. He took a nitro tablet and it eased up. This was not similar to his prior angina. He started having chest discomfort again so he took another nitro with relief. After he had to take a 3rd nitro he decided to come to the ED via squad. Right now, he states that he does not have any chest pain. Review of Systems:  Constitutional: No fatigue, weakness, night sweats or fever. HEENT: No new vision difficulties or ringing in the ears. Respiratory: No new SOB, PND, orthopnea or cough. Cardiovascular: See HPI   GI: No n/v, diarrhea, constipation, abdominal pain or changes in bowel habits. No melena, no hematochezia  : No urinary frequency, urgency, incontinence, hematuria or dysuria. Skin: No cyanosis or skin lesions. Musculoskeletal: No new muscle or joint pain. Neurological: No syncope or TIA-like symptoms.   Psychiatric: No anxiety, insomnia or depression     Past Medical History:   Diagnosis Date    Arthritis     CAD (coronary artery disease)     Erectile dysfunction  GERD (gastroesophageal reflux disease)     High blood pressure     Hyperlipidemia     Neuropathy     disc diseae of back with sciatica.     Obesity      Past Surgical History:   Procedure Laterality Date    APPENDECTOMY      BACK SURGERY  2009    CARPAL TUNNEL RELEASE      CHOLECYSTECTOMY      CORONARY ANGIOPLASTY WITH STENT PLACEMENT      5 stents    GALLBLADDER SURGERY       Family History   Problem Relation Age of Onset    Kidney Disease Mother     Diabetes Mother     Heart Failure Father      Social History     Tobacco Use    Smoking status: Former Smoker     Packs/day: 0.50     Years: 1.00     Pack years: 0.50     Last attempt to quit: 5/3/1970     Years since quittin.6    Smokeless tobacco: Never Used   Substance Use Topics    Alcohol use: No    Drug use: No       Allergies   Allergen Reactions    Doxazosin Shortness Of Breath    Norvasc [Amlodipine Besylate]      Current Facility-Administered Medications   Medication Dose Route Frequency Provider Last Rate Last Dose    heparin 25,000 unit in sodium chloride 0.45% 250 mL infusion  10 mL/hr Intravenous Continuous Makayla Seals MD 10 mL/hr at 20 0930 10 mL/hr at 20 0930    aspirin chewable tablet 81 mg  81 mg Oral Daily Makayla Seals MD        [START ON 2020] clopidogrel (PLAVIX) tablet 75 mg  75 mg Oral Daily Makayla Seals MD        atorvastatin (LIPITOR) tablet 80 mg  80 mg Oral Nightly Makayla Seals MD        cloNIDine (CATAPRES) tablet 0.2 mg  0.2 mg Oral TID Makayla Seals MD        fluticasone [facility]) 50 MCG/ACT nasal spray 1 spray  1 spray Nasal Daily Makayla Seals MD        furosemide (LASIX) tablet 20 mg  20 mg Oral Daily Makayla Seals MD        gabapentin (NEURONTIN) capsule 600 mg  600 mg Oral TID Makayla Seals MD        hydrALAZINE (APRESOLINE) tablet 75 mg  75 mg Oral BID Makayla Seals MD        insulin glargine (LANTUS) injection vial 55 Units  55 Units Subcutaneous Daily Amarjit Wynn MD        lisinopril (PRINIVIL;ZESTRIL) tablet 40 mg  40 mg Oral Daily Amarjit Wynn MD        metoprolol succinate (TOPROL XL) extended release tablet 200 mg  200 mg Oral Daily Amarjit Wynn MD        pantoprazole (PROTONIX) tablet 40 mg  40 mg Oral Daily Amarjit Wynn MD        polyvinyl alcohol (LIQUIFILM TEARS) 1.4 % ophthalmic solution 1 drop  1 drop Both Eyes TID Amarjti Wynn MD        ranolazine [facility]) extended release tablet 1,000 mg  1,000 mg Oral BID Amarjit Wynn MD        sodium chloride (OCEAN) 0.65 % nasal spray 1 spray  1 spray Nasal PRN Amarjit Wynn MD        [facility]) capsule 0.4 mg  0.4 mg Oral Nightly Amarjit Wynn MD        sodium chloride flush 0.9 % injection 10 mL  10 mL Intravenous 2 times per day Amarjit Wynn MD        sodium chloride flush 0.9 % injection 10 mL  10 mL Intravenous PRN Amarjit Wynn MD        acetaminophen (TYLENOL) tablet 650 mg  650 mg Oral Q6H PRN Amarjit Wynn MD        Or    acetaminophen (TYLENOL) suppository 650 mg  650 mg Rectal Q6H PRN Amarjit Wynn MD        polyethylene glycol [facility]) packet 17 g  17 g Oral Daily PRN Amarjit Wynn MD        promethazine (PHENERGAN) tablet 12.5 mg  12.5 mg Oral Q6H PRN Amarjit Wynn MD        Or    [facility]ron [facility]) injection 4 mg  4 mg Intravenous Q6H PRN Amarjit Wynn MD        glucose (GLUTOSE) 40 % oral gel 15 g  15 g Oral PRN Amarjit Wynn MD        dextrose 50 % IV solution  12.5 g Intravenous PRN Amarjit Wynn MD        glucagon (rDNA) injection 1 mg  1 mg Intramuscular PRN Amarjit Wynn MD        dextrose 5 % solution  100 mL/hr Intravenous PRN Amarjit Wynn MD        insulin lispro (HUMALOG) injection vial 0-6 Units  0-6 Units Subcutaneous TID WC Amarjit Wynn MD        insulin lispro (HUMALOG) injection vial 0-3 Units  0-3 Units Subcutaneous Nightly Amarjit Wynn MD        [START ON 12/6/2020] influenza quadrivalent split

## 2020-12-05 NOTE — ED PROVIDER NOTES
I independently evaluated and obtained a history and physical on Sanna Johnson. All diagnostic, treatment, and disposition assistants were made to myself in conjunction the advanced practice provider. For further details of this patient's emergency department encounter, please see the advanced practice provider's documentation. History: This patient presents emergency department complaint of chest pressure. This patient has a past medical history of coronary artery disease to include CABG. He states approximately 6 months ago he had 3 of his bypasses that it failed. He reports that he underwent several stents at that time. He is been compliant with his medications. For the last 2 to 3 weeks has had increased dyspnea with exertion. This morning woke up at approximately 4:00 AM and noticed a little ache in his chest.  He took a nitro and it went away very quickly. Around 6:00 this morning he had a little bit more ache in his chest and by 630 the ache was pretty significant. It covered all the way across his anterior chest and went into his bilateral armpits and up into the right side of his neck. No ripping or tearing pain. He states he took a total of 3 nitroglycerin and his pain almost completely resolved. He reports mild consistent continued ache. No ripping or tearing pain. No back pain. No abdominal pain. No nausea or vomiting. No diaphoresis. Physician Exam: Pleasant male in no acute distress. He is not diaphoretic. He is mildly hypertensive. He is afebrile. His BMI is 41.59. His cardiac exam is regular. No murmurs. Lungs are clear to auscultation bilaterally. No wheezing rales or rhonchi. His abdomen is obese. He has a pacemaker in his left anterior chest wall and there is no redness or tenderness around the site. The Ekg interpreted by me shows  normal sinus rhythm with a rate of 66  Axis is   Normal  QTc is  503 msec  Left bundle branch block, first-degree AV block.

## 2020-12-05 NOTE — ED NOTES
Pt states that he is experiencing back pain now, ED MD made aware, repeat EKG ordered and completed      Varsha Stringer RN  12/05/20 0916

## 2020-12-05 NOTE — PROGRESS NOTES
Physical Therapy    Facility/Department: [address] PROGRESSIVE CARE  Initial Assessment    NAME: Sanna Johnson  : 1945  MRN: [id number]    Date of Service: 2020    Discharge Recommendations:  Continue to assess pending progress, Home with assist PRN, 3-5 sessions per week       Sanna Johnson scored a 18/24 on the AM-PAC short mobility form. Current research shows that an AM-PAC score of 17 or less is typically not associated with a discharge to the patient's home setting. Based on the patient's AM-PAC score and their current functional mobility deficits, it is recommended that the patient have 3-5 sessions per week of Physical Therapy at d/c to increase the patient's independence. Please see assessment section for further patient specific details. If patient discharges prior to next session this note will serve as a discharge summary. Please see below for the latest assessment towards goals. Assessment   Body structures, Functions, Activity limitations: Decreased functional mobility ; Decreased safe awareness;Decreased balance;Decreased endurance; Increased pain;Decreased strength  Assessment: The patient is 70 y.o. male with a past medical history significant for obesity, essential hypertension, hyperlipidemia and CAD with prior CABG () and recent PCI who [facility] with chest pain.   Patient is currently functioning below baseline (Independent PTA) and will benefit from continued skilled PT intervention to address impaired strength, bed mobility, transfers, and gait  Treatment Diagnosis: Impaired functional mobility  Prognosis: Good  Decision Making: Low Complexity  History: see above  Exam: see above  Clinical Presentation: CGA for transfers and SBA-CGA for gait with rolling walker  PT Education: Goals;Transfer Training;Equipment;PT Role;Functional Mobility Training;Plan of Care;General Safety;Gait Training  Barriers to Learning: none  REQUIRES PT FOLLOW UP: Yes  Activity Tolerance  Activity Tolerance: Patient Tolerated treatment well;Patient limited by pain; Patient limited by fatigue       Patient Diagnosis(es): The primary encounter diagnosis was Chest pain, unspecified type. Diagnoses of Coronary artery disease involving native heart with unstable angina pectoris, unspecified vessel or lesion type (HCC) and Troponin level elevated were also pertinent to this visit. has a past medical history of Arthritis, CAD (coronary artery disease), Erectile dysfunction, GERD (gastroesophageal reflux disease), High blood pressure, Hyperlipidemia, Neuropathy, and Obesity. has a past surgical history that includes Carpal tunnel release; Gallbladder surgery; back surgery (2009); Cholecystectomy; Coronary angioplasty with stent; and Appendectomy (1976). Restrictions  Restrictions/Precautions  Restrictions/Precautions: Fall Risk  Vision/Hearing  Vision: Within Functional Limits(wears glasses)  Hearing: Within functional limits     Subjective  General  Chart Reviewed: Yes  Patient assessed for rehabilitation services? : (na)  Additional Pertinent Hx: The patient is 70 y.o. male with a past medical history significant for obesity, essential hypertension, hyperlipidemia and CAD with prior PCI who presented to [facility] with chest pain. Response To Previous Treatment: Not applicable  Family / Caregiver Present: No  Referring Practitioner:   Referral Date : 12/05/20  Diagnosis: Unstable Angina  Follows Commands: Within Functional Limits  Subjective  Subjective: Pt is feeling good upon arrival, he is very willing to work with PT.   Pain Screening  Patient Currently in Pain: No          Orientation  Orientation  Overall Orientation Status: Within Normal Limits  Social/Functional History  Social/Functional History  Lives With: Spouse  Type of Home: House  Home Layout: One level  Home Access: Level entry  Bathroom Toilet: Standard  Home Equipment: 4 wheeled walker, Electric scooter  Receives Help From: Family  ADL Assistance: Independent  Homemaking Assistance: Needs assistance  Homemaking Responsibilities: No  Ambulation Assistance: Independent(Pt reports in the house he does not use a A. D. but does find he reaches out to balance on objects. When out of the house, he either uses a rolling walker or scooter depending on how far he needs to go)  Transfer Assistance: Independent  Active : Yes  Cognition        Objective          AROM RLE (degrees)  RLE AROM: WFL  AROM LLE (degrees)  LLE AROM : WFL  Strength RLE  Strength RLE: WFL(4+/5 except ankle DF, pt with foot drop)  Strength LLE  Strength LLE: WFL(4+/5 except ankle DF, pt with foot drop)  Strength RUE  Strength RUE: (4+/5 except foot drop)     Sensation  Overall Sensation Status: WFL  Bed mobility  Comment: NT, pt up in chair upon arrival and requesting back to chair at end of session  Transfers  Sit to Stand: Contact guard assistance(posterior lean with initial standing)  Stand to sit: Contact guard assistance  Ambulation  Ambulation?: Yes  WB Status: full  More Ambulation?: No  Ambulation 1  Surface: level tile  Device: Rolling Walker  Assistance: Stand by assistance;Contact guard assistance  Gait Deviations: Shuffles;Decreased step length  Distance: 80 feet  Comments: Pt reports he started to feel increased pain in his low back toward the end of the walk, resolved some with sitting but pt has chronic back issues.   Stairs/Curb  Stairs?: No     Balance  Posture: Good  Sitting - Static: Good  Sitting - Dynamic: Good  Standing - Static: Fair;+  Standing - Dynamic: Fair;+        Plan   Plan  Times per week: 3-5  Plan weeks: 1  Current Treatment Recommendations: Strengthening, Transfer Training, Endurance Training, Patient/Caregiver Education & Training, Equipment Evaluation, Education, & procurement, Balance Training, Gait Training, Home Exercise Program, Functional Mobility Training, Safety Education & Training  Safety Devices  Type of devices:

## 2020-12-05 NOTE — ED NOTES
pts wife called and notified that she was able to come into the room with her       Thania Sanz RN  12/05/20 9316

## 2020-12-05 NOTE — PROGRESS NOTES
Medication Reconciliation    List of medications patient is currently taking is complete. Source of information: 1. Conversation with patient at bedside                                      2. EPIC records                                      3. Patient provided medication list     Notes regarding home medications:   1.  Patient's medication list now reflects patient's home medication list.    Terrence Eubanks, Pharmacy Intern  12/5/2020 1:39 PM

## 2020-12-06 LAB
ALBUMIN SERPL-MCNC: 3.3 G/DL (ref 3.4–5)
ALBUMIN SERPL-MCNC: 3.6 G/DL (ref 3.4–5)
ALP BLD-CCNC: 83 U/L (ref 40–129)
ALT SERPL-CCNC: 11 U/L (ref 10–40)
ANION GAP SERPL CALCULATED.3IONS-SCNC: 10 MMOL/L (ref 3–16)
ANION GAP SERPL CALCULATED.3IONS-SCNC: 12 MMOL/L (ref 3–16)
APTT: 49.4 SEC (ref 24.2–36.2)
APTT: 53.8 SEC (ref 24.2–36.2)
AST SERPL-CCNC: 12 U/L (ref 15–37)
BASOPHILS ABSOLUTE: 0 K/UL (ref 0–0.2)
BASOPHILS RELATIVE PERCENT: 0.2 %
BILIRUB SERPL-MCNC: 0.3 MG/DL (ref 0–1)
BILIRUBIN DIRECT: <0.2 MG/DL (ref 0–0.3)
BILIRUBIN, INDIRECT: ABNORMAL MG/DL (ref 0–1)
BUN BLDV-MCNC: 28 MG/DL (ref 7–20)
BUN BLDV-MCNC: 31 MG/DL (ref 7–20)
CALCIUM SERPL-MCNC: 9 MG/DL (ref 8.3–10.6)
CALCIUM SERPL-MCNC: 9.1 MG/DL (ref 8.3–10.6)
CHLORIDE BLD-SCNC: 100 MMOL/L (ref 99–110)
CHLORIDE BLD-SCNC: 102 MMOL/L (ref 99–110)
CO2: 26 MMOL/L (ref 21–32)
CO2: 26 MMOL/L (ref 21–32)
CREAT SERPL-MCNC: 1.1 MG/DL (ref 0.8–1.3)
CREAT SERPL-MCNC: 1.4 MG/DL (ref 0.8–1.3)
EKG ATRIAL RATE: 64 BPM
EKG DIAGNOSIS: NORMAL
EKG P AXIS: 20 DEGREES
EKG P-R INTERVAL: 192 MS
EKG Q-T INTERVAL: 474 MS
EKG QRS DURATION: 192 MS
EKG QTC CALCULATION (BAZETT): 489 MS
EKG R AXIS: 35 DEGREES
EKG T AXIS: 202 DEGREES
EKG VENTRICULAR RATE: 64 BPM
EOSINOPHILS ABSOLUTE: 0.1 K/UL (ref 0–0.6)
EOSINOPHILS RELATIVE PERCENT: 0.7 %
GFR AFRICAN AMERICAN: >60
GFR AFRICAN AMERICAN: >60
GFR NON-AFRICAN AMERICAN: 50
GFR NON-AFRICAN AMERICAN: >60
GLUCOSE BLD-MCNC: 136 MG/DL (ref 70–99)
GLUCOSE BLD-MCNC: 139 MG/DL (ref 70–99)
GLUCOSE BLD-MCNC: 155 MG/DL (ref 70–99)
GLUCOSE BLD-MCNC: 158 MG/DL (ref 70–99)
GLUCOSE BLD-MCNC: 158 MG/DL (ref 70–99)
GLUCOSE BLD-MCNC: 204 MG/DL (ref 70–99)
HCT VFR BLD CALC: 40.9 % (ref 40.5–52.5)
HEMOGLOBIN: 13.8 G/DL (ref 13.5–17.5)
LYMPHOCYTES ABSOLUTE: 2.8 K/UL (ref 1–5.1)
LYMPHOCYTES RELATIVE PERCENT: 29.1 %
MCH RBC QN AUTO: 31.7 PG (ref 26–34)
MCHC RBC AUTO-ENTMCNC: 33.6 G/DL (ref 31–36)
MCV RBC AUTO: 94.1 FL (ref 80–100)
MONOCYTES ABSOLUTE: 0.6 K/UL (ref 0–1.3)
MONOCYTES RELATIVE PERCENT: 6.1 %
NEUTROPHILS ABSOLUTE: 6.1 K/UL (ref 1.7–7.7)
NEUTROPHILS RELATIVE PERCENT: 63.9 %
PDW BLD-RTO: 16.3 % (ref 12.4–15.4)
PERFORMED ON: ABNORMAL
PHOSPHORUS: 2.8 MG/DL (ref 2.5–4.9)
PLATELET # BLD: 179 K/UL (ref 135–450)
PMV BLD AUTO: 6.6 FL (ref 5–10.5)
POTASSIUM REFLEX MAGNESIUM: 4.4 MMOL/L (ref 3.5–5.1)
POTASSIUM SERPL-SCNC: 4.6 MMOL/L (ref 3.5–5.1)
RBC # BLD: 4.35 M/UL (ref 4.2–5.9)
SODIUM BLD-SCNC: 136 MMOL/L (ref 136–145)
SODIUM BLD-SCNC: 140 MMOL/L (ref 136–145)
TOTAL PROTEIN: 6.2 G/DL (ref 6.4–8.2)
TROPONIN: 0.1 NG/ML
WBC # BLD: 9.5 K/UL (ref 4–11)

## 2020-12-06 PROCEDURE — 99233 SBSQ HOSP IP/OBS HIGH 50: CPT | Performed by: INTERNAL MEDICINE

## 2020-12-06 PROCEDURE — 2500000003 HC RX 250 WO HCPCS: Performed by: HOSPITALIST

## 2020-12-06 PROCEDURE — G0008 ADMIN INFLUENZA VIRUS VAC: HCPCS

## 2020-12-06 PROCEDURE — 2580000003 HC RX 258: Performed by: HOSPITALIST

## 2020-12-06 PROCEDURE — 80076 HEPATIC FUNCTION PANEL: CPT

## 2020-12-06 PROCEDURE — 84484 ASSAY OF TROPONIN QUANT: CPT

## 2020-12-06 PROCEDURE — 36415 COLL VENOUS BLD VENIPUNCTURE: CPT

## 2020-12-06 PROCEDURE — 80069 RENAL FUNCTION PANEL: CPT

## 2020-12-06 PROCEDURE — 85730 THROMBOPLASTIN TIME PARTIAL: CPT

## 2020-12-06 PROCEDURE — 6370000000 HC RX 637 (ALT 250 FOR IP): Performed by: INTERNAL MEDICINE

## 2020-12-06 PROCEDURE — 6360000002 HC RX W HCPCS: Performed by: HOSPITALIST

## 2020-12-06 PROCEDURE — 90686 IIV4 VACC NO PRSV 0.5 ML IM: CPT | Performed by: HOSPITALIST

## 2020-12-06 PROCEDURE — 6370000000 HC RX 637 (ALT 250 FOR IP): Performed by: HOSPITALIST

## 2020-12-06 PROCEDURE — 2060000000 HC ICU INTERMEDIATE R&B

## 2020-12-06 PROCEDURE — 36430 TRANSFUSION BLD/BLD COMPNT: CPT

## 2020-12-06 PROCEDURE — 85025 COMPLETE CBC W/AUTO DIFF WBC: CPT

## 2020-12-06 PROCEDURE — 2580000003 HC RX 258: Performed by: INTERNAL MEDICINE

## 2020-12-06 PROCEDURE — 93010 ELECTROCARDIOGRAM REPORT: CPT | Performed by: INTERNAL MEDICINE

## 2020-12-06 RX ORDER — SODIUM CHLORIDE 0.9 % (FLUSH) 0.9 %
10 SYRINGE (ML) INJECTION EVERY 12 HOURS SCHEDULED
Status: DISCONTINUED | OUTPATIENT
Start: 2020-12-06 | End: 2020-12-08 | Stop reason: HOSPADM

## 2020-12-06 RX ORDER — SODIUM CHLORIDE 0.9 % (FLUSH) 0.9 %
10 SYRINGE (ML) INJECTION PRN
Status: DISCONTINUED | OUTPATIENT
Start: 2020-12-06 | End: 2020-12-08 | Stop reason: HOSPADM

## 2020-12-06 RX ORDER — SODIUM CHLORIDE 9 MG/ML
INJECTION, SOLUTION INTRAVENOUS CONTINUOUS
Status: DISCONTINUED | OUTPATIENT
Start: 2020-12-07 | End: 2020-12-08 | Stop reason: HOSPADM

## 2020-12-06 RX ADMIN — HEPARIN SODIUM 10 ML/HR: 10000 INJECTION, SOLUTION INTRAVENOUS at 09:32

## 2020-12-06 RX ADMIN — ASPIRIN 81 MG: 81 TABLET, CHEWABLE ORAL at 08:49

## 2020-12-06 RX ADMIN — NITROGLYCERIN 0.5 INCH: 20 OINTMENT TOPICAL at 06:52

## 2020-12-06 RX ADMIN — CLONIDINE HYDROCHLORIDE 0.1 MG: 0.1 TABLET ORAL at 22:25

## 2020-12-06 RX ADMIN — METOPROLOL SUCCINATE 200 MG: 50 TABLET, EXTENDED RELEASE ORAL at 08:50

## 2020-12-06 RX ADMIN — LISINOPRIL 40 MG: 40 TABLET ORAL at 08:51

## 2020-12-06 RX ADMIN — INSULIN GLARGINE 55 UNITS: 100 INJECTION, SOLUTION SUBCUTANEOUS at 09:35

## 2020-12-06 RX ADMIN — GABAPENTIN 600 MG: 300 CAPSULE ORAL at 21:05

## 2020-12-06 RX ADMIN — INSULIN LISPRO 2 UNITS: 100 INJECTION, SOLUTION INTRAVENOUS; SUBCUTANEOUS at 13:51

## 2020-12-06 RX ADMIN — PANTOPRAZOLE SODIUM 40 MG: 40 TABLET, DELAYED RELEASE ORAL at 22:23

## 2020-12-06 RX ADMIN — INFLUENZA A VIRUS A/VICTORIA/2454/2019 IVR-207 (H1N1) ANTIGEN (PROPIOLACTONE INACTIVATED), INFLUENZA A VIRUS A/HONG KONG/2671/2019 IVR-208 (H3N2) ANTIGEN (PROPIOLACTONE INACTIVATED), INFLUENZA B VIRUS B/VICTORIA/705/2018 BVR-11 ANTIGEN (PROPIOLACTONE INACTIVATED), INFLUENZA B VIRUS B/PHUKET/3073/2013 BVR-1B ANTIGEN (PROPIOLACTONE INACTIVATED) 0.5 ML: 15; 15; 15; 15 INJECTION, SUSPENSION INTRAMUSCULAR at 18:41

## 2020-12-06 RX ADMIN — INSULIN LISPRO 1 UNITS: 100 INJECTION, SOLUTION INTRAVENOUS; SUBCUTANEOUS at 22:24

## 2020-12-06 RX ADMIN — Medication 10 ML: at 08:52

## 2020-12-06 RX ADMIN — CLOPIDOGREL BISULFATE 75 MG: 75 TABLET ORAL at 08:49

## 2020-12-06 RX ADMIN — GABAPENTIN 600 MG: 300 CAPSULE ORAL at 08:49

## 2020-12-06 RX ADMIN — TAMSULOSIN HYDROCHLORIDE 0.4 MG: 0.4 CAPSULE ORAL at 21:07

## 2020-12-06 RX ADMIN — ISOSORBIDE MONONITRATE 120 MG: 60 TABLET, EXTENDED RELEASE ORAL at 08:49

## 2020-12-06 RX ADMIN — CLONIDINE HYDROCHLORIDE 0.1 MG: 0.1 TABLET ORAL at 13:45

## 2020-12-06 RX ADMIN — CLONIDINE HYDROCHLORIDE 0.1 MG: 0.1 TABLET ORAL at 08:49

## 2020-12-06 RX ADMIN — HYDRALAZINE HYDROCHLORIDE 50 MG: 50 TABLET, FILM COATED ORAL at 08:52

## 2020-12-06 RX ADMIN — HYDRALAZINE HYDROCHLORIDE 50 MG: 50 TABLET, FILM COATED ORAL at 21:04

## 2020-12-06 RX ADMIN — ATORVASTATIN CALCIUM 80 MG: 80 TABLET, FILM COATED ORAL at 21:07

## 2020-12-06 RX ADMIN — RANOLAZINE 1000 MG: 500 TABLET, FILM COATED, EXTENDED RELEASE ORAL at 08:49

## 2020-12-06 RX ADMIN — Medication 10 ML: at 22:24

## 2020-12-06 RX ADMIN — GABAPENTIN 600 MG: 300 CAPSULE ORAL at 13:45

## 2020-12-06 RX ADMIN — INSULIN LISPRO 1 UNITS: 100 INJECTION, SOLUTION INTRAVENOUS; SUBCUTANEOUS at 18:45

## 2020-12-06 RX ADMIN — Medication 10 ML: at 13:45

## 2020-12-06 RX ADMIN — RANOLAZINE 1000 MG: 500 TABLET, FILM COATED, EXTENDED RELEASE ORAL at 21:07

## 2020-12-06 ASSESSMENT — PAIN DESCRIPTION - ONSET: ONSET: ON-GOING

## 2020-12-06 ASSESSMENT — PAIN DESCRIPTION - PROGRESSION
CLINICAL_PROGRESSION: NOT CHANGED

## 2020-12-06 ASSESSMENT — PAIN SCALES - GENERAL
PAINLEVEL_OUTOF10: 0
PAINLEVEL_OUTOF10: 0
PAINLEVEL_OUTOF10: 6
PAINLEVEL_OUTOF10: 0

## 2020-12-06 ASSESSMENT — PAIN DESCRIPTION - DIRECTION: RADIATING_TOWARDS: NO

## 2020-12-06 ASSESSMENT — PAIN DESCRIPTION - PAIN TYPE: TYPE: ACUTE PAIN;CHRONIC PAIN

## 2020-12-06 ASSESSMENT — PAIN DESCRIPTION - ORIENTATION: ORIENTATION: ANTERIOR

## 2020-12-06 ASSESSMENT — PAIN DESCRIPTION - DESCRIPTORS: DESCRIPTORS: HEADACHE

## 2020-12-06 ASSESSMENT — PAIN DESCRIPTION - LOCATION: LOCATION: HEAD

## 2020-12-06 ASSESSMENT — PAIN DESCRIPTION - FREQUENCY: FREQUENCY: INTERMITTENT

## 2020-12-06 ASSESSMENT — PAIN - FUNCTIONAL ASSESSMENT: PAIN_FUNCTIONAL_ASSESSMENT: PREVENTS OR INTERFERES SOME ACTIVE ACTIVITIES AND ADLS

## 2020-12-06 NOTE — PROGRESS NOTES
Clinical Pharmacy Note  Heparin Dosing       Lab Results   Component Value Date    APTT 49.3 12/05/2020     Lab Results   Component Value Date    HGB 14.3 12/05/2020    HCT 43.0 12/05/2020     12/05/2020       Current Infusion Rate: 10 mL/hr    Plan:  Continue same rate: 10 mL/hr  Next aPTT: 12/06/20 0000    Pharmacy will continue to monitor and adjust based on aPTT results.   Livia Beck, [facility]

## 2020-12-06 NOTE — PROGRESS NOTES
Hospitalist Progress Note      PCP: Blaire Douglas DO    Date of Admission: 12/5/2020    Chief Complaint: CP    Hospital Course: 75m with CAD admitted with NSTEMI, creatinine up    Subjective: denies worsening chest pain, sob, abdominal pain, n/v      Medications:  Reviewed    Infusion Medications    [START ON 12/7/2020] sodium chloride      heparin (Porcine) 10 mL/hr (12/06/20 0932)    dextrose       Scheduled Medications    sodium chloride flush  10 mL Intravenous 2 times per day    aspirin  81 mg Oral Daily    clopidogrel  75 mg Oral Daily    atorvastatin  80 mg Oral Nightly    furosemide  20 mg Oral Daily    gabapentin  600 mg Oral TID    hydrALAZINE  50 mg Oral BID    insulin glargine  55 Units Subcutaneous Daily    [Held by provider] lisinopril  40 mg Oral Daily    metoprolol succinate  200 mg Oral Daily    pantoprazole  40 mg Oral Daily    ranolazine  1,000 mg Oral BID    tamsulosin  0.4 mg Oral Nightly    sodium chloride flush  10 mL Intravenous 2 times per day    insulin lispro  0-6 Units Subcutaneous TID WC    insulin lispro  0-3 Units Subcutaneous Nightly    influenza virus vaccine  0.5 mL Intramuscular Once    isosorbide mononitrate  120 mg Oral Daily    cloNIDine  0.1 mg Oral TID     PRN Meds: sodium chloride flush, sodium chloride flush, acetaminophen **OR** acetaminophen, polyethylene glycol, promethazine **OR** ondansetron, glucose, dextrose, glucagon (rDNA), dextrose, fluticasone, polyvinyl alcohol      Intake/Output Summary (Last 24 hours) at 12/6/2020 1532  Last data filed at 12/6/2020 1000  Gross per 24 hour   Intake 818.6 ml   Output 660 ml   Net 158.6 ml       Physical Exam Performed:    /62   Pulse 60   Temp 97.8 °F (36.6 °C) (Oral)   Resp 18   Wt 274 lb 0.5 oz (124.3 kg)   SpO2 95%   BMI 41.06 kg/m²     General appearance: No apparent distress, appears stated age and cooperative. HEENT: Pupils equal, round, and reactive to light. Conjunctivae/corneas clear. Neck: Supple, with full range of motion. No jugular venous distention. Trachea midline. Respiratory:  Normal respiratory effort. Clear to auscultation, bilaterally without Rales/Wheezes/Rhonchi. Cardiovascular: Regular rate and rhythm with normal S1/S2 without murmurs, rubs or gallops. Abdomen: Soft, non-tender, non-distended with normal bowel sounds. Musculoskeletal: No clubbing, cyanosis or edema bilaterally. Full range of motion without deformity. Skin: Skin color, texture, turgor normal.  No rashes or lesions. Neurologic:  Neurovascularly intact without any focal sensory/motor deficits. Cranial nerves: II-XII intact, grossly non-focal.  Psychiatric: Alert and oriented, thought content appropriate, normal insight  Capillary Refill: Brisk,< 3 seconds   Peripheral Pulses: +2 palpable, equal bilaterally       Labs:   Recent Labs     12/05/20  0746 12/05/20  1200 12/06/20  0430   WBC 12.1* 10.4 9.5   HGB 14.8 14.3 13.8   HCT 45.6 43.0 40.9    185 179     Recent Labs     12/05/20  0747 12/06/20  0430   * 140   K 4.8 4.4    102   CO2 25 26   BUN 26* 31*   CREATININE 1.1 1.4*   CALCIUM 8.9 9.0     Recent Labs     12/05/20  0747 12/06/20  0430   AST 12* 12*   ALT 14 11   BILIDIR  --  <0.2   BILITOT <0.2 0.3   ALKPHOS 99 83     No results for input(s): INR in the last 72 hours. Recent Labs     12/05/20  1405 12/05/20  1816 12/06/20  0430   TROPONINI 0.02* 0.05* 0.10*       Urinalysis:      Lab Results   Component Value Date    BLOODU neg 09/21/2011    SPECGRAV 1.010 09/21/2011    GLUCOSEU neg 09/21/2011       Radiology:  XR CHEST PORTABLE   Final Result   No acute cardiopulmonary disease.                  Assessment/Plan:    Active Hospital Problems    Diagnosis Date Noted    NSTEMI (non-ST elevated myocardial infarction) ([facility] [address] [I21.4] 12/05/2020       1) NSTEMI  - heparin gtts, asa, plavix  - angio planned for joe    2) XAVI  - hold lisinopril  - repeat renal, check

## 2020-12-06 NOTE — PROGRESS NOTES
Nightly    sodium chloride flush  10 mL Intravenous 2 times per day    insulin lispro  0-6 Units Subcutaneous TID WC    insulin lispro  0-3 Units Subcutaneous Nightly    influenza virus vaccine  0.5 mL Intramuscular Once    isosorbide mononitrate  120 mg Oral Daily    nitroglycerin  0.5 inch Topical 4 times per day    cloNIDine  0.1 mg Oral TID      heparin (Porcine) 10 mL/hr (12/06/20 0932)    dextrose         Lab Data:  CBC:   Recent Labs     12/05/20  0746 12/05/20  1200 12/06/20  0430   WBC 12.1* 10.4 9.5   HGB 14.8 14.3 13.8    185 179     BMP:    Recent Labs     12/05/20  0747 12/06/20  0430   * 140   K 4.8 4.4   CO2 25 26   BUN 26* 31*   CREATININE 1.1 1.4*     LIVR:   Recent Labs     12/05/20  0747 12/06/20  0430   AST 12* 12*   ALT 14 11     PT/INR:   Recent Labs     12/05/20  0747 12/06/20  0430   PROT 6.8 6.2*     APTT:   Recent Labs     12/05/20  1816 12/06/20  0039 12/06/20  0803   APTT 49.3* 49.4* 53.8*     BNP:  No results for input(s): BNP in the last 72 hours. CARDIAC ENZYMES:  Recent Labs     12/05/20  0747 12/05/20  1405 12/05/20  1816   TROPONINI 0.02* 0.02* 0.05*      Assessment:  1. NSTEMI  2. Essential Hypertension  3. Hyperlipidemia with goal LDL<70mg/dL  4. Pacemaker in situ     Plan: At this point, I would call Bebeto's event a NSTEMI. Continue his current medications including the heparin drip. I would plan for a left heart cath tomorrow via a left radial approach. I discussed the risks and benefits of cardiac catheterization with the patient. I also discussed the possible therapies including medical management, angioplasty and stenting or coronary bypass surgery. The patient is amenable to undergoing the procedure. We will have this scheduled.                  Signed:  Raheem Myers MD

## 2020-12-06 NOTE — PLAN OF CARE
Problem: Infection:  Goal: Will remain free from infection  Description: Will remain free from infection  Outcome: Ongoing     Problem: Safety:  Goal: Free from accidental physical injury  Description: Free from accidental physical injury  Outcome: Ongoing  Goal: Free from intentional harm  Description: Free from intentional harm  Outcome: Ongoing     Problem: Daily Care:  Goal: Daily care needs are met  Description: Daily care needs are met  Outcome: Ongoing     Problem: Pain:  Goal: Patient's pain/discomfort is manageable  Description: Patient's pain/discomfort is manageable  Outcome: Ongoing  Goal: Pain level will decrease  Description: Pain level will decrease  Outcome: Ongoing  Goal: Control of acute pain  Description: Control of acute pain  Outcome: Ongoing  Goal: Control of chronic pain  Description: Control of chronic pain  Outcome: Ongoing     Problem: Skin Integrity:  Goal: Skin integrity will stabilize  Description: Skin integrity will stabilize  Outcome: Ongoing     Problem: Discharge Planning:  Goal: Patients continuum of care needs are met  Description: Patients continuum of care needs are met  Outcome: Ongoing     Problem: Falls - Risk of:  Goal: Will remain free from falls  Description: Will remain free from falls  Outcome: Ongoing  Goal: Absence of physical injury  Description: Absence of physical injury  Outcome: Ongoing     Problem: OXYGENATION/RESPIRATORY FUNCTION  Goal: Patient will maintain patent airway  Outcome: Ongoing  Goal: Patient will achieve/maintain normal respiratory rate/effort  Description: Respiratory rate and effort will be within normal limits for the patient  Outcome: Ongoing     Problem: HEMODYNAMIC STATUS  Goal: Patient has stable vital signs and fluid balance  Outcome: Ongoing     Problem: FLUID AND ELECTROLYTE IMBALANCE  Goal: Fluid and electrolyte balance are achieved/maintained  Outcome: Ongoing     Problem: ACTIVITY INTOLERANCE/IMPAIRED MOBILITY  Goal: Mobility/activity is maintained at optimum level for patient  Outcome: Ongoing

## 2020-12-06 NOTE — PROGRESS NOTES
Clinical Pharmacy Note  Heparin Dosing       Lab Results   Component Value Date    APTT 53.8 12/06/2020     Lab Results   Component Value Date    HGB 13.8 12/06/2020    HCT 40.9 12/06/2020     12/06/2020       Current Infusion Rate: 10 mL/hr    Plan:    No change in rate. Continue: 10 mL/hr  Next aPTT: 12/07/20  0600    Pharmacy will continue to monitor and adjust based on aPTT results.     Joe LoraD, BCPS  12/6/2020  11:26 AM

## 2020-12-06 NOTE — PROGRESS NOTES
Clinical Pharmacy Note  Heparin Dosing       Lab Results   Component Value Date    APTT 49.4 12/06/2020     Lab Results   Component Value Date    HGB 14.3 12/05/2020    HCT 43.0 12/05/2020     12/05/2020       Current Infusion Rate: 10 mL/hr    Plan:  Rate: continue at 10 mL/hr  Next aPTT: 12/6/20 0800    Pharmacy will continue to monitor and adjust based on aPTT results.   Servando Archer, JoeD

## 2020-12-07 LAB
APTT: 50.8 SEC (ref 24.2–36.2)
GLUCOSE BLD-MCNC: 126 MG/DL (ref 70–99)
GLUCOSE BLD-MCNC: 152 MG/DL (ref 70–99)
HCT VFR BLD CALC: 40.8 % (ref 40.5–52.5)
HEMOGLOBIN: 13.7 G/DL (ref 13.5–17.5)
MCH RBC QN AUTO: 31.7 PG (ref 26–34)
MCHC RBC AUTO-ENTMCNC: 33.5 G/DL (ref 31–36)
MCV RBC AUTO: 94.7 FL (ref 80–100)
PDW BLD-RTO: 16.2 % (ref 12.4–15.4)
PERFORMED ON: ABNORMAL
PERFORMED ON: ABNORMAL
PLATELET # BLD: 172 K/UL (ref 135–450)
PMV BLD AUTO: 6.6 FL (ref 5–10.5)
POC ACT LR: 182 SEC
POC ACT LR: 260 SEC
POC ACT LR: 272 SEC
POC ACT LR: >400 SEC
RBC # BLD: 4.31 M/UL (ref 4.2–5.9)
WBC # BLD: 9.5 K/UL (ref 4–11)

## 2020-12-07 PROCEDURE — B2111ZZ FLUOROSCOPY OF MULTIPLE CORONARY ARTERIES USING LOW OSMOLAR CONTRAST: ICD-10-PCS | Performed by: INTERNAL MEDICINE

## 2020-12-07 PROCEDURE — 94760 N-INVAS EAR/PLS OXIMETRY 1: CPT

## 2020-12-07 PROCEDURE — C1894 INTRO/SHEATH, NON-LASER: HCPCS

## 2020-12-07 PROCEDURE — 93459 L HRT ART/GRFT ANGIO: CPT

## 2020-12-07 PROCEDURE — 36415 COLL VENOUS BLD VENIPUNCTURE: CPT

## 2020-12-07 PROCEDURE — 2500000003 HC RX 250 WO HCPCS

## 2020-12-07 PROCEDURE — 99153 MOD SED SAME PHYS/QHP EA: CPT

## 2020-12-07 PROCEDURE — 97116 GAIT TRAINING THERAPY: CPT

## 2020-12-07 PROCEDURE — C1887 CATHETER, GUIDING: HCPCS

## 2020-12-07 PROCEDURE — 93459 L HRT ART/GRFT ANGIO: CPT | Performed by: INTERNAL MEDICINE

## 2020-12-07 PROCEDURE — C1753 CATH, INTRAVAS ULTRASOUND: HCPCS

## 2020-12-07 PROCEDURE — 02C03ZZ EXTIRPATION OF MATTER FROM CORONARY ARTERY, ONE ARTERY, PERCUTANEOUS APPROACH: ICD-10-PCS | Performed by: INTERNAL MEDICINE

## 2020-12-07 PROCEDURE — 85027 COMPLETE CBC AUTOMATED: CPT

## 2020-12-07 PROCEDURE — 93005 ELECTROCARDIOGRAM TRACING: CPT | Performed by: INTERNAL MEDICINE

## 2020-12-07 PROCEDURE — B2151ZZ FLUOROSCOPY OF LEFT HEART USING LOW OSMOLAR CONTRAST: ICD-10-PCS | Performed by: INTERNAL MEDICINE

## 2020-12-07 PROCEDURE — 6370000000 HC RX 637 (ALT 250 FOR IP): Performed by: HOSPITALIST

## 2020-12-07 PROCEDURE — 99152 MOD SED SAME PHYS/QHP 5/>YRS: CPT

## 2020-12-07 PROCEDURE — 92924 PRQ TRLUML C ATHRC 1 ART&/BR: CPT | Performed by: INTERNAL MEDICINE

## 2020-12-07 PROCEDURE — 97110 THERAPEUTIC EXERCISES: CPT

## 2020-12-07 PROCEDURE — 6370000000 HC RX 637 (ALT 250 FOR IP): Performed by: INTERNAL MEDICINE

## 2020-12-07 PROCEDURE — 6360000002 HC RX W HCPCS

## 2020-12-07 PROCEDURE — 92978 ENDOLUMINL IVUS OCT C 1ST: CPT

## 2020-12-07 PROCEDURE — 92924 PRQ TRLUML C ATHRC 1 ART&/BR: CPT

## 2020-12-07 PROCEDURE — 85347 COAGULATION TIME ACTIVATED: CPT

## 2020-12-07 PROCEDURE — C1885 CATH, TRANSLUMIN ANGIO LASER: HCPCS

## 2020-12-07 PROCEDURE — C1769 GUIDE WIRE: HCPCS

## 2020-12-07 PROCEDURE — 2580000003 HC RX 258: Performed by: INTERNAL MEDICINE

## 2020-12-07 PROCEDURE — 85730 THROMBOPLASTIN TIME PARTIAL: CPT

## 2020-12-07 PROCEDURE — C1725 CATH, TRANSLUMIN NON-LASER: HCPCS

## 2020-12-07 PROCEDURE — 2060000000 HC ICU INTERMEDIATE R&B

## 2020-12-07 PROCEDURE — 2709999900 HC NON-CHARGEABLE SUPPLY

## 2020-12-07 PROCEDURE — 92978 ENDOLUMINL IVUS OCT C 1ST: CPT | Performed by: INTERNAL MEDICINE

## 2020-12-07 PROCEDURE — 4A023N7 MEASUREMENT OF CARDIAC SAMPLING AND PRESSURE, LEFT HEART, PERCUTANEOUS APPROACH: ICD-10-PCS | Performed by: INTERNAL MEDICINE

## 2020-12-07 RX ORDER — GLIPIZIDE 5 MG/1
5 TABLET ORAL
Status: DISCONTINUED | OUTPATIENT
Start: 2020-12-07 | End: 2020-12-08 | Stop reason: HOSPADM

## 2020-12-07 RX ORDER — ATROPINE SULFATE 0.4 MG/ML
0.5 AMPUL (ML) INJECTION
Status: DISCONTINUED | OUTPATIENT
Start: 2020-12-07 | End: 2020-12-07 | Stop reason: ALTCHOICE

## 2020-12-07 RX ORDER — ISOSORBIDE MONONITRATE 60 MG/1
120 TABLET, EXTENDED RELEASE ORAL DAILY
Status: DISCONTINUED | OUTPATIENT
Start: 2020-12-08 | End: 2020-12-08 | Stop reason: HOSPADM

## 2020-12-07 RX ORDER — ACETAMINOPHEN 325 MG/1
650 TABLET ORAL EVERY 4 HOURS PRN
Status: DISCONTINUED | OUTPATIENT
Start: 2020-12-07 | End: 2020-12-07 | Stop reason: SDUPTHER

## 2020-12-07 RX ORDER — MORPHINE SULFATE 2 MG/ML
2 INJECTION, SOLUTION INTRAMUSCULAR; INTRAVENOUS
Status: DISCONTINUED | OUTPATIENT
Start: 2020-12-07 | End: 2020-12-07 | Stop reason: SDUPTHER

## 2020-12-07 RX ORDER — 0.9 % SODIUM CHLORIDE 0.9 %
250 INTRAVENOUS SOLUTION INTRAVENOUS PRN
Status: DISCONTINUED | OUTPATIENT
Start: 2020-12-07 | End: 2020-12-08 | Stop reason: HOSPADM

## 2020-12-07 RX ORDER — HYDRALAZINE HYDROCHLORIDE 20 MG/ML
10 INJECTION INTRAMUSCULAR; INTRAVENOUS
Status: DISCONTINUED | OUTPATIENT
Start: 2020-12-07 | End: 2020-12-08 | Stop reason: HOSPADM

## 2020-12-07 RX ORDER — ONDANSETRON 2 MG/ML
4 INJECTION INTRAMUSCULAR; INTRAVENOUS EVERY 6 HOURS PRN
Status: DISCONTINUED | OUTPATIENT
Start: 2020-12-07 | End: 2020-12-07 | Stop reason: SDUPTHER

## 2020-12-07 RX ORDER — SODIUM CHLORIDE 0.9 % (FLUSH) 0.9 %
10 SYRINGE (ML) INJECTION EVERY 12 HOURS SCHEDULED
Status: DISCONTINUED | OUTPATIENT
Start: 2020-12-07 | End: 2020-12-07 | Stop reason: SDUPTHER

## 2020-12-07 RX ORDER — SODIUM CHLORIDE 0.9 % (FLUSH) 0.9 %
10 SYRINGE (ML) INJECTION PRN
Status: DISCONTINUED | OUTPATIENT
Start: 2020-12-07 | End: 2020-12-07 | Stop reason: SDUPTHER

## 2020-12-07 RX ADMIN — APIXABAN 5 MG: 5 TABLET, FILM COATED ORAL at 19:54

## 2020-12-07 RX ADMIN — ATORVASTATIN CALCIUM 80 MG: 80 TABLET, FILM COATED ORAL at 21:29

## 2020-12-07 RX ADMIN — GABAPENTIN 600 MG: 300 CAPSULE ORAL at 09:04

## 2020-12-07 RX ADMIN — METOPROLOL SUCCINATE 200 MG: 50 TABLET, EXTENDED RELEASE ORAL at 09:04

## 2020-12-07 RX ADMIN — Medication 10 ML: at 11:39

## 2020-12-07 RX ADMIN — ISOSORBIDE MONONITRATE 120 MG: 60 TABLET, EXTENDED RELEASE ORAL at 09:04

## 2020-12-07 RX ADMIN — RANOLAZINE 1000 MG: 500 TABLET, FILM COATED, EXTENDED RELEASE ORAL at 09:04

## 2020-12-07 RX ADMIN — INSULIN LISPRO 1 UNITS: 100 INJECTION, SOLUTION INTRAVENOUS; SUBCUTANEOUS at 21:28

## 2020-12-07 RX ADMIN — RANOLAZINE 1000 MG: 500 TABLET, FILM COATED, EXTENDED RELEASE ORAL at 21:29

## 2020-12-07 RX ADMIN — Medication 10 ML: at 09:05

## 2020-12-07 RX ADMIN — TAMSULOSIN HYDROCHLORIDE 0.4 MG: 0.4 CAPSULE ORAL at 21:29

## 2020-12-07 RX ADMIN — ASPIRIN 81 MG: 81 TABLET, CHEWABLE ORAL at 09:05

## 2020-12-07 RX ADMIN — HYDRALAZINE HYDROCHLORIDE 50 MG: 50 TABLET, FILM COATED ORAL at 09:03

## 2020-12-07 RX ADMIN — CLOPIDOGREL BISULFATE 75 MG: 75 TABLET ORAL at 09:04

## 2020-12-07 RX ADMIN — GABAPENTIN 600 MG: 300 CAPSULE ORAL at 21:30

## 2020-12-07 RX ADMIN — GLIPIZIDE 5 MG: 5 TABLET ORAL at 19:54

## 2020-12-07 RX ADMIN — PANTOPRAZOLE SODIUM 40 MG: 40 TABLET, DELAYED RELEASE ORAL at 21:29

## 2020-12-07 RX ADMIN — CLONIDINE HYDROCHLORIDE 0.1 MG: 0.1 TABLET ORAL at 21:29

## 2020-12-07 RX ADMIN — CLONIDINE HYDROCHLORIDE 0.1 MG: 0.1 TABLET ORAL at 09:04

## 2020-12-07 RX ADMIN — SODIUM CHLORIDE 75 ML/HR: 9 INJECTION, SOLUTION INTRAVENOUS at 05:09

## 2020-12-07 RX ADMIN — HYDRALAZINE HYDROCHLORIDE 50 MG: 50 TABLET, FILM COATED ORAL at 21:34

## 2020-12-07 ASSESSMENT — PAIN - FUNCTIONAL ASSESSMENT: PAIN_FUNCTIONAL_ASSESSMENT: PREVENTS OR INTERFERES SOME ACTIVE ACTIVITIES AND ADLS

## 2020-12-07 ASSESSMENT — PAIN SCALES - GENERAL
PAINLEVEL_OUTOF10: 0
PAINLEVEL_OUTOF10: 4
PAINLEVEL_OUTOF10: 1
PAINLEVEL_OUTOF10: 0

## 2020-12-07 ASSESSMENT — PAIN DESCRIPTION - ONSET: ONSET: ON-GOING

## 2020-12-07 ASSESSMENT — PAIN DESCRIPTION - PROGRESSION
CLINICAL_PROGRESSION: NOT CHANGED

## 2020-12-07 ASSESSMENT — PAIN DESCRIPTION - ORIENTATION: ORIENTATION: ANTERIOR

## 2020-12-07 ASSESSMENT — PAIN DESCRIPTION - PAIN TYPE: TYPE: ACUTE PAIN

## 2020-12-07 ASSESSMENT — PAIN DESCRIPTION - LOCATION: LOCATION: CHEST;SHOULDER

## 2020-12-07 ASSESSMENT — PAIN DESCRIPTION - FREQUENCY: FREQUENCY: CONTINUOUS

## 2020-12-07 NOTE — PROGRESS NOTES
Informed Patient that he is now NPO and Patient refused to give up his cup of water and stated \"I get to dry through the night , my tongue gets stuck to top of my mouth. \" Patient refused to be NPO.

## 2020-12-07 NOTE — PLAN OF CARE
Problem: Infection:  Goal: Will remain free from infection  Description: Will remain free from infection  12/7/2020 1117 by Jose M Corrigan RN  Outcome: Ongoing  12/7/2020 0144 by Sara Morfin RN  Outcome: Ongoing     Problem: Safety:  Goal: Free from accidental physical injury  Description: Free from accidental physical injury  12/7/2020 1117 by Jose M Corrigan RN  Outcome: Ongoing  12/7/2020 0144 by Sara Morfin RN  Outcome: Ongoing  Goal: Free from intentional harm  Description: Free from intentional harm  12/7/2020 1117 by Jose M Corrigan RN  Outcome: Ongoing  12/7/2020 0144 by Sara Morfin RN  Outcome: Ongoing     Problem: Daily Care:  Goal: Daily care needs are met  Description: Daily care needs are met  12/7/2020 1117 by oJse M Corrigan RN  Outcome: Ongoing  12/7/2020 0144 by Sara Morfin RN  Outcome: Ongoing     Problem: Pain:  Goal: Patient's pain/discomfort is manageable  Description: Patient's pain/discomfort is manageable  12/7/2020 1117 by Jose M Corrigan RN  Outcome: Ongoing  12/7/2020 0144 by Sara Morfin RN  Outcome: Ongoing  Goal: Pain level will decrease  Description: Pain level will decrease  12/7/2020 1117 by Jose M Corrigan RN  Outcome: Ongoing  12/7/2020 0144 by Sara Morfin RN  Outcome: Ongoing  Goal: Control of acute pain  Description: Control of acute pain  12/7/2020 1117 by Jose M Corrigan RN  Outcome: Ongoing  12/7/2020 0144 by Sara Morfin RN  Outcome: Ongoing  Goal: Control of chronic pain  Description: Control of chronic pain  12/7/2020 1117 by Jose M Corrigan RN  Outcome: Ongoing  12/7/2020 0144 by Sara Morfin RN  Outcome: Ongoing     Problem: Skin Integrity:  Goal: Skin integrity will stabilize  Description: Skin integrity will stabilize  12/7/2020 1117 by Jose M Corrigan RN  Outcome: Ongoing  12/7/2020 0144 by Sara Morfin RN  Outcome: Ongoing     Problem: Discharge Planning:  Goal: Patients continuum of care needs are met  Description: Patients continuum of care needs are met  12/7/2020 1117 by Jennifer Mohamud RN  Outcome: Ongoing  12/7/2020 0144 by Ivanna Duke RN  Outcome: Ongoing     Problem: Falls - Risk of:  Goal: Will remain free from falls  Description: Will remain free from falls  12/7/2020 1117 by Jennifer Mohamud RN  Outcome: Ongoing  12/7/2020 0144 by Ivanna Duke RN  Outcome: Ongoing  Goal: Absence of physical injury  Description: Absence of physical injury  12/7/2020 1117 by Jennifer Mohamud RN  Outcome: Ongoing  12/7/2020 0144 by Ivanna Duke RN  Outcome: Ongoing     Problem: OXYGENATION/RESPIRATORY FUNCTION  Goal: Patient will maintain patent airway  12/7/2020 1117 by Jennifer Mohamud RN  Outcome: Ongoing  12/7/2020 0144 by Ivanna Duke RN  Outcome: Ongoing  Goal: Patient will achieve/maintain normal respiratory rate/effort  Description: Respiratory rate and effort will be within normal limits for the patient  12/7/2020 1117 by Jennifer Mohamud RN  Outcome: Ongoing  12/7/2020 0144 by Ivanna Duke RN  Outcome: Ongoing     Problem: HEMODYNAMIC STATUS  Goal: Patient has stable vital signs and fluid balance  12/7/2020 1117 by Jennifer Mohamud RN  Outcome: Ongoing  12/7/2020 0144 by Ivanna Duke RN  Outcome: Ongoing     Problem: FLUID AND ELECTROLYTE IMBALANCE  Goal: Fluid and electrolyte balance are achieved/maintained  12/7/2020 1117 by Jennifer Mohamud RN  Outcome: Ongoing  12/7/2020 0144 by Ivanna Duke RN  Outcome: Ongoing     Problem: ACTIVITY INTOLERANCE/IMPAIRED MOBILITY  Goal: Mobility/activity is maintained at optimum level for patient  12/7/2020 1117 by Jennifer Mohamud RN  Outcome: Ongoing  12/7/2020 0144 by Ivanna Duke RN  Outcome: Ongoing

## 2020-12-07 NOTE — PROGRESS NOTES
Clinical Pharmacy Note  Heparin Dosing       Lab Results   Component Value Date    APTT 50.8 12/07/2020     Lab Results   Component Value Date    HGB 13.7 12/07/2020    HCT 40.8 12/07/2020     12/07/2020       Current Infusion Rate: 10 mL/hr    Plan:  Rate: continue at 10 mL/hr  Next aPTT: 0600 12/8/20    Pharmacy will continue to monitor and adjust based on aPTT results.   Lakeisha Dunaway, PharmD

## 2020-12-07 NOTE — CARE COORDINATION
Case Management:  [facility] here and approved for admit with authorization number [id number]. Electronically signed by Chucho Wong on 12/7/2020 at 12:03 PM     INITIAL CASE MANAGEMENT ASSESSMENT    Reviewed chart, met with patient to assess possible discharge needs. Explained Case Management role/services. Living Situation:  Lives in one level home with basement with level entry with wife. ADLs:  Independent but uses a 4 wheeled walker or scooter when he goes out due to chronic back issues. DME:  Four wheeled walker , electric scooter. PT/OT Recs:  Home care but patient declines at this time due to chronic nature of back issues. Active Services:  N/A      Transportation: Active       Medications:  No issues getting, taking or affording medication. Patient is %100 [facility] with [facility]. Normally he will take a paper script to [place] to get filled. PCP:  Dr. Ish Pradhan       HD/PD:  N/A     PLAN/COMMENTS: Goal:  Return home with no anticipated needs. SW/CM provided contact information for patient or family to call with any questions. SW/CM will follow and assist as needed.     Electronically signed by Chucho Wong on 12/7/2020 at 12:33 PM

## 2020-12-07 NOTE — PROGRESS NOTES
Occupational Therapy    Attempted to see pt for OT evaluation. Pt off floor at cath lab. Will follow up as pt condition permit.     Electronically signed by Fiona Griffin OT on 12/7/2020 at 2:01 PM

## 2020-12-07 NOTE — PROGRESS NOTES
Hospitalist Progress Note      PCP: Rodger Antonio DO    Date of Admission: 12/5/2020    Chief Complaint: chest pain    Hospital Course: This is a 82-year-old male with a history of CAD, hyperlipidemia, hypertension, GERD and obesity who presented to the ED with complaints of chest pain at rest.  Initially found to be hypoglycemic and this improved with eating however continued to have chest pain in which he used nitroglycerin that did relieve his pain. Chest pain reoccurred later in the morning and he presented to the ED for further evaluation. Cardiology was consulted. Angiogram performed today with laser performed.      Subjective:   Has some cp after the procedure but denies any fevers, chills, sob, nausea, vomiting or other sx tonight       Medications:  Reviewed    Infusion Medications    sodium chloride 75 mL/hr (12/07/20 0509)    heparin (Porcine) 10 mL/hr (12/06/20 0932)    dextrose       Scheduled Medications    sodium chloride flush  10 mL Intravenous 2 times per day    aspirin  81 mg Oral Daily    clopidogrel  75 mg Oral Daily    atorvastatin  80 mg Oral Nightly    furosemide  20 mg Oral Daily    gabapentin  600 mg Oral TID    hydrALAZINE  50 mg Oral BID    insulin glargine  55 Units Subcutaneous Daily    [Held by provider] lisinopril  40 mg Oral Daily    metoprolol succinate  200 mg Oral Daily    pantoprazole  40 mg Oral Daily    ranolazine  1,000 mg Oral BID    tamsulosin  0.4 mg Oral Nightly    sodium chloride flush  10 mL Intravenous 2 times per day    insulin lispro  0-6 Units Subcutaneous TID WC    insulin lispro  0-3 Units Subcutaneous Nightly    isosorbide mononitrate  120 mg Oral Daily    cloNIDine  0.1 mg Oral TID     PRN Meds: sodium chloride flush, sodium chloride flush, acetaminophen **OR** acetaminophen, polyethylene glycol, promethazine **OR** ondansetron, glucose, dextrose, glucagon (rDNA), dextrose, fluticasone, polyvinyl alcohol      Intake/Output Summary (Last 24 hours) at 12/7/2020 1034  Last data filed at 12/7/2020 0509  Gross per 24 hour   Intake 912 ml   Output 1390 ml   Net -478 ml       Physical Exam Performed:    BP (!) 163/74   Pulse 60   Temp 97.4 °F (36.3 °C) (Oral)   Resp 18   Wt 272 lb 14.9 oz (123.8 kg)   SpO2 95%   BMI 40.90 kg/m²     General appearance: No apparent distress, appears stated age and cooperative. obese  HEENT: Pupils equal, round, and reactive to light. Conjunctivae/corneas clear. Neck: Supple, with full range of motion. No jugular venous distention. Trachea midline. Respiratory:  Normal respiratory effort. Clear to auscultation, bilaterally without Rales/Wheezes/Rhonchi. Cardiovascular: Regular rate and rhythm with normal S1/S2 without murmurs, rubs or gallops. Abdomen: Soft, non-tender, non-distended with normal bowel sounds. Musculoskeletal: No clubbing, cyanosis or edema bilaterally. Full range of motion without deformity  Skin: Skin color, texture, turgor normal.  No rashes or lesions. Neurologic:  Neurovascularly intact without any focal sensory/motor deficits. Psychiatric: Alert and oriented, thought content appropriate, normal insight  Capillary Refill: Brisk,< 3 seconds   Peripheral Pulses: +2 palpable, equal bilaterally       Labs:   Recent Labs     12/05/20  1200 12/06/20  0430 12/07/20  0449   WBC 10.4 9.5 9.5   HGB 14.3 13.8 13.7   HCT 43.0 40.9 40.8    179 172     Recent Labs     12/05/20  0747 12/06/20  0430 12/06/20  1617   * 140 136   K 4.8 4.4 4.6    102 100   CO2 25 26 26   BUN 26* 31* 28*   CREATININE 1.1 1.4* 1.1   CALCIUM 8.9 9.0 9.1   PHOS  --   --  2.8     Recent Labs     12/05/20  0747 12/06/20  0430   AST 12* 12*   ALT 14 11   BILIDIR  --  <0.2   BILITOT <0.2 0.3   ALKPHOS 99 83     No results for input(s): INR in the last 72 hours.   Recent Labs     12/05/20  1405 12/05/20  1816 12/06/20  0430   TROPONINI 0.02* 0.05* 0.10*       Urinalysis:      Lab Results   Component Value Date    BLOODU neg 09/21/2011    SPECGRAV 1.010 09/21/2011    GLUCOSEU neg 09/21/2011       Radiology:  XR CHEST PORTABLE   Final Result   No acute cardiopulmonary disease.              Assessment/Plan:    Active Hospital Problems    Diagnosis    NSTEMI (non-ST elevated myocardial infarction) ([facility] [address] [I21.4]     NSTEMI  XAVI-resolved  Diabetes mellitus type 2 with hyperglycemia  Hyperlipidemia, unspecified  GERD  CAD w/ PCI to CX 3/2015  H/o CHB with PM    Postop care from angiogram  Stop heparin drip  Continue statin, aspirin and Plavix  Lisinopril on hold d/t XAVI      DVT Prophylaxis: Lovenox  Diet: Diet NPO Time Specified Exceptions are: Sips with Meds  Code Status: Full Code    PT/OT Eval Status:    Dispo -inpatient    Mart Hem, APRN - CNP

## 2020-12-07 NOTE — PROGRESS NOTES
Physical Therapy  Facility/Department: [facility] PROGRESSIVE CARE  Daily Treatment Note  NAME: Corrine Valentine  : 1945  MRN: [id number]    Date of Service: 2020  Corrine Valentine scored a 19/24 on the AM-PAC short mobility form. Current research shows that an AM-PAC score of 18 or greater is typically associated with a discharge to the patient's home setting. Based on the patient's AM-PAC score and their current functional mobility deficits, it would be recommended that the patient have 2-3 sessions per week of Physical Therapy at d/c to increase the patient's independence. At this time, this patient demonstrates the endurance and safety to discharge home But Pt. Declines the need for further PT at d/c.  Pt.'s deficits are chronic d/t Lumbar spine impairments. Please see assessment section for further patient specific details. If patient discharges prior to next session this note will serve as a discharge summary. Please see below for the latest assessment towards goals. Discharge Recommendations:  Continue to assess pending progress, 2-3 sessions per week   PT Equipment Recommendations  Equipment Needed: No  Other: Pt. has necessary equipment for d/c    Assessment   Assessment: The patient is 70 y.o. male with a past medical history significant for obesity, essential hypertension, hyperlipidemia and CAD with prior CABG () and recent PCI who presented to [facility] with chest pain. Patient is currently functioning slightly below baseline (Independent PTA) and will benefit from continued skilled PT intervention to address impaired strength, bed mobility, transfers, and gait. Discussed Outpatient PT with Pt. to work on gait and blaance but Pt. denies need.   Will continue to follow while in hospital.  Prognosis: Good  PT Education: Goals;Transfer Training;Equipment;PT Role;Functional Mobility Training;Plan of Care;General Safety;Gait Training  Patient Education: Verbalized understanding - Pt. self Supervision(with use of rail)  Rolling to Right: Supervision(with use of rail)  Supine to Sit: Supervision(with use of rail)  Sit to Supine: Supervision  Scooting: Supervision  Transfers  Sit to Stand: Contact guard assistance(Stands with Supervision but requires CGA to maintain upright standing as sways posteriorly)  Stand to sit: Contact guard assistance  Ambulation  Ambulation?: Yes  More Ambulation?: No  Ambulation 1  Surface: level tile  Device: Rolling Walker  Assistance: Stand by assistance;Contact guard assistance  Quality of Gait: Wide STEPHENIE, foot drop B chronic but Pt. compensates well  Distance: ~125'  Comments: c/o B shoulder pain  Stairs/Curb  Stairs?: No     Balance  Sitting - Static: Good  Sitting - Dynamic: Good  Standing - Static: Fair  Standing - Dynamic: Fair  Comments: Static and dynamic standing with sway  Exercises  Hip Flexion: x 15 B LE  Hip Abduction: x 15 B LE  Knee Long Arc Quad: x 15 B LE  Ankle Pumps: x 15 B LE PF only as Pt. has 0 to 1/5 DF B  Other exercises  Other exercises?: No         Comment: Set up for comfort in supine position                                                                        AM-PAC Score  AM-PAC Inpatient Mobility Raw Score : 19 (12/07/20 0951)  AM-PAC Inpatient T-Scale Score : 45.44 (12/07/20 0951)  Mobility Inpatient CMS 0-100% Score: 41.77 (12/07/20 0951)  Mobility Inpatient CMS G-Code Modifier : CK (12/07/20 0951)          Goals  Short term goals  Time Frame for Short term goals: Upon discharge  Short term goal 1: Independent with HEP  Short term goal 2: Bed mobility SBA  Short term goal 3: Transfers SBA  Short term goal 4: Ambulate 150 feet with least restrictive A.D. and SBA  Long term goals  Time Frame for Long term goals : TBD  Patient Goals   Patient goals :  To get home as soon as possible    Plan    Plan  Times per week: 3-5  Plan weeks: 1  Current Treatment Recommendations: Strengthening, Transfer Training, Endurance Training, Patient/Caregiver Education & Training, Equipment Evaluation, Education, & procurement, Balance Training, Gait Training, Home Exercise Program, Functional Mobility Training, Safety Education & Training  Safety Devices  Type of devices:  All fall risk precautions in place, Gait belt, Call light within reach, Nurse notified, Bed alarm in place, Left in bed  Restraints  Initially in place: No     Therapy Time   Individual Concurrent Group Co-treatment   Time In 0825         Time Out 0850         Minutes 25         Timed Code Treatment Minutes: [place], [place], [id number]

## 2020-12-08 VITALS
OXYGEN SATURATION: 95 % | HEART RATE: 70 BPM | WEIGHT: 273.59 LBS | DIASTOLIC BLOOD PRESSURE: 76 MMHG | SYSTOLIC BLOOD PRESSURE: 127 MMHG | RESPIRATION RATE: 16 BRPM | TEMPERATURE: 97.5 F | BODY MASS INDEX: 40.99 KG/M2

## 2020-12-08 LAB
ANION GAP SERPL CALCULATED.3IONS-SCNC: 9 MMOL/L (ref 3–16)
BUN BLDV-MCNC: 22 MG/DL (ref 7–20)
CALCIUM SERPL-MCNC: 8.8 MG/DL (ref 8.3–10.6)
CHLORIDE BLD-SCNC: 102 MMOL/L (ref 99–110)
CHOLESTEROL, TOTAL: 123 MG/DL (ref 0–199)
CO2: 24 MMOL/L (ref 21–32)
CREAT SERPL-MCNC: 1.1 MG/DL (ref 0.8–1.3)
EKG ATRIAL RATE: 63 BPM
EKG DIAGNOSIS: NORMAL
EKG P-R INTERVAL: 216 MS
EKG Q-T INTERVAL: 476 MS
EKG QRS DURATION: 166 MS
EKG QTC CALCULATION (BAZETT): 487 MS
EKG R AXIS: 71 DEGREES
EKG T AXIS: 209 DEGREES
EKG VENTRICULAR RATE: 63 BPM
GFR AFRICAN AMERICAN: >60
GFR NON-AFRICAN AMERICAN: >60
GLUCOSE BLD-MCNC: 121 MG/DL (ref 70–99)
GLUCOSE BLD-MCNC: 95 MG/DL (ref 70–99)
HDLC SERPL-MCNC: 30 MG/DL (ref 40–60)
LDL CHOLESTEROL CALCULATED: 52 MG/DL
PERFORMED ON: ABNORMAL
PLATELET # BLD: 175 K/UL (ref 135–450)
POTASSIUM SERPL-SCNC: 4.3 MMOL/L (ref 3.5–5.1)
SODIUM BLD-SCNC: 135 MMOL/L (ref 136–145)
TRIGL SERPL-MCNC: 204 MG/DL (ref 0–150)
VLDLC SERPL CALC-MCNC: 41 MG/DL

## 2020-12-08 PROCEDURE — 80048 BASIC METABOLIC PNL TOTAL CA: CPT

## 2020-12-08 PROCEDURE — 6370000000 HC RX 637 (ALT 250 FOR IP): Performed by: INTERNAL MEDICINE

## 2020-12-08 PROCEDURE — 97535 SELF CARE MNGMENT TRAINING: CPT

## 2020-12-08 PROCEDURE — 6370000000 HC RX 637 (ALT 250 FOR IP): Performed by: HOSPITALIST

## 2020-12-08 PROCEDURE — 93010 ELECTROCARDIOGRAM REPORT: CPT | Performed by: INTERNAL MEDICINE

## 2020-12-08 PROCEDURE — 97166 OT EVAL MOD COMPLEX 45 MIN: CPT

## 2020-12-08 PROCEDURE — 99233 SBSQ HOSP IP/OBS HIGH 50: CPT | Performed by: INTERNAL MEDICINE

## 2020-12-08 PROCEDURE — 97530 THERAPEUTIC ACTIVITIES: CPT

## 2020-12-08 PROCEDURE — 36415 COLL VENOUS BLD VENIPUNCTURE: CPT

## 2020-12-08 PROCEDURE — 80061 LIPID PANEL: CPT

## 2020-12-08 PROCEDURE — 85049 AUTOMATED PLATELET COUNT: CPT

## 2020-12-08 RX ORDER — ASPIRIN 81 MG/1
81 TABLET, CHEWABLE ORAL DAILY
Qty: 30 TABLET | Refills: 0 | COMMUNITY
Start: 2020-12-09 | End: 2021-07-07

## 2020-12-08 RX ADMIN — FUROSEMIDE 20 MG: 20 TABLET ORAL at 08:03

## 2020-12-08 RX ADMIN — GLIPIZIDE 5 MG: 5 TABLET ORAL at 07:11

## 2020-12-08 RX ADMIN — ISOSORBIDE MONONITRATE 120 MG: 60 TABLET, EXTENDED RELEASE ORAL at 08:02

## 2020-12-08 RX ADMIN — HYDRALAZINE HYDROCHLORIDE 50 MG: 50 TABLET, FILM COATED ORAL at 08:02

## 2020-12-08 RX ADMIN — ASPIRIN 81 MG: 81 TABLET, CHEWABLE ORAL at 08:02

## 2020-12-08 RX ADMIN — INSULIN GLARGINE 55 UNITS: 100 INJECTION, SOLUTION SUBCUTANEOUS at 08:04

## 2020-12-08 RX ADMIN — APIXABAN 5 MG: 5 TABLET, FILM COATED ORAL at 08:02

## 2020-12-08 RX ADMIN — CLOPIDOGREL BISULFATE 75 MG: 75 TABLET ORAL at 08:02

## 2020-12-08 RX ADMIN — METOPROLOL SUCCINATE 200 MG: 50 TABLET, EXTENDED RELEASE ORAL at 08:02

## 2020-12-08 RX ADMIN — GABAPENTIN 600 MG: 300 CAPSULE ORAL at 08:02

## 2020-12-08 RX ADMIN — RANOLAZINE 1000 MG: 500 TABLET, FILM COATED, EXTENDED RELEASE ORAL at 08:02

## 2020-12-08 RX ADMIN — CLONIDINE HYDROCHLORIDE 0.1 MG: 0.1 TABLET ORAL at 08:02

## 2020-12-08 ASSESSMENT — PAIN SCALES - GENERAL: PAINLEVEL_OUTOF10: 0

## 2020-12-08 NOTE — PROGRESS NOTES
by provider] lisinopril  40 mg Oral Daily    metoprolol succinate  200 mg Oral Daily    pantoprazole  40 mg Oral Daily    ranolazine  1,000 mg Oral BID    tamsulosin  0.4 mg Oral Nightly    insulin lispro  0-6 Units Subcutaneous TID WC    insulin lispro  0-3 Units Subcutaneous Nightly    cloNIDine  0.1 mg Oral TID      sodium chloride 75 mL/hr (12/07/20 0509)    dextrose         Lab Data:  CBC:   Recent Labs     12/05/20  1200 12/06/20  0430 12/07/20  0449 12/08/20  0434   WBC 10.4 9.5 9.5  --    HGB 14.3 13.8 13.7  --     179 172 175     BMP:    Recent Labs     12/06/20  0430 12/06/20  1617 12/08/20  0434    136 135*   K 4.4 4.6 4.3   CO2 26 26 24   BUN 31* 28* 22*   CREATININE 1.4* 1.1 1.1     LIVR:   Recent Labs     12/06/20  0430   AST 12*   ALT 11     PT/INR:   Recent Labs     12/06/20  0430   PROT 6.2*     APTT:   Recent Labs     12/06/20  0039 12/06/20  0803 12/07/20  0448   APTT 49.4* 53.8* 50.8*     BNP:  No results for input(s): BNP in the last 72 hours. CARDIAC ENZYMES:  Recent Labs     12/05/20  1405 12/05/20  1816 12/06/20  0430   TROPONINI 0.02* 0.05* 0.10*         Assessment:  1. Unstable Angina  2. Essential Hypertension  3. Hyperlipidemia with goal LDL<70mg/dL  4. Pacemaker in situ      Plan:   Virginia Fleming seems to be doing well today after laser atherectomy and PTCA of his subtotally occluded ramus intermedius branch. I would continue DAPT for him with aspirin and clopidogrel. I would switch him to Brilinta ordinarily but he is also on Eliquis (presumably for a-fib). He wants to follow-up at the [place]. I did tell him that if he were to follow with us, I would consider adding a PCSK9 inhibitor. He is okay to discharge to home today.            Signed:  Radha Hawk MD

## 2020-12-08 NOTE — PROGRESS NOTES
Physician Progress Note      PATIENT:               Ashley Weston  CSN #:                  [id number]  :                       1945  ADMIT DATE:       2020 7:24 AM  DISCH DATE:        2020 12:05 PM  RESPONDING  PROVIDER #:        Elena Hunter CNP          QUERY TEXT:    Dear Chidi Mcgovern, APRN, CNP,  Patient admitted with BMI 40.90. If possible, please document in progress   notes and discharge summary if you are evaluating and /or treating any of the   following: The medical record reflects the following:  Risk Factors: Hx DM, HLD  Clinical Indicators: BMI=40.90, 5' 8.5\", 272 lb  Treatment:  when appropriate  Thank you,  Juan Ac RN, SALENA [email or web address]  Options provided:  -- Morbid obesity  -- Obesity  -- Overweight  -- Other - I will add my own diagnosis  -- Disagree - Not applicable / Not valid  -- Disagree - Clinically unable to determine / Unknown  -- Refer to Clinical Documentation Reviewer    PROVIDER RESPONSE TEXT:    This patient has morbid obesity. Query created by:  Angelo7 KATH Calloway on 2020 12:40 PM      Electronically signed by:  Angelia Hunter CNP 2020 3:08 PM

## 2020-12-08 NOTE — PLAN OF CARE
Problem: Infection:  Goal: Will remain free from infection  Description: Will remain free from infection  Outcome: Ongoing     Problem: Safety:  Goal: Free from accidental physical injury  Description: Free from accidental physical injury  Outcome: Ongoing  Goal: Free from intentional harm  Description: Free from intentional harm  Outcome: Ongoing     Problem: Daily Care:  Goal: Daily care needs are met  Description: Daily care needs are met  Outcome: Ongoing     Problem: Pain:  Goal: Patient's pain/discomfort is manageable  Description: Patient's pain/discomfort is manageable  Outcome: Ongoing  Goal: Pain level will decrease  Description: Pain level will decrease  Outcome: Ongoing  Goal: Control of acute pain  Description: Control of acute pain  Outcome: Ongoing  Goal: Control of chronic pain  Description: Control of chronic pain  Outcome: Ongoing     Problem: Skin Integrity:  Goal: Skin integrity will stabilize  Description: Skin integrity will stabilize  Outcome: Ongoing     Problem: Discharge Planning:  Goal: Patients continuum of care needs are met  Description: Patients continuum of care needs are met  Outcome: Ongoing     Problem: Falls - Risk of:  Goal: Will remain free from falls  Description: Will remain free from falls  Outcome: Ongoing  Goal: Absence of physical injury  Description: Absence of physical injury  Outcome: Ongoing     Problem: OXYGENATION/RESPIRATORY FUNCTION  Goal: Patient will maintain patent airway  Outcome: Ongoing  Goal: Patient will achieve/maintain normal respiratory rate/effort  Description: Respiratory rate and effort will be within normal limits for the patient  Outcome: Ongoing     Problem: HEMODYNAMIC STATUS  Goal: Patient has stable vital signs and fluid balance  Outcome: Ongoing     Problem: FLUID AND ELECTROLYTE IMBALANCE  Goal: Fluid and electrolyte balance are achieved/maintained  Outcome: Ongoing     Problem: ACTIVITY INTOLERANCE/IMPAIRED MOBILITY  Goal: Mobility/activity is maintained at optimum level for patient  Outcome: Ongoing     Problem: Skin Integrity:  Goal: Will show no infection signs and symptoms  Description: Will show no infection signs and symptoms  Outcome: Ongoing  Goal: Absence of new skin breakdown  Description: Absence of new skin breakdown  Outcome: Ongoing

## 2020-12-08 NOTE — DISCHARGE SUMMARY
Hospital Medicine Discharge Summary      Patient ID: Rosetta Goldberg      Patient's PCP: Derek Kruger DO    Admit Date: 12/5/2020     Discharge Date: 12/8/2020       Admitting Physician: Jonathan Simon MD     Discharge Provider: BRIAN Willett - CNP     Consults: Cardiology, Dr. Anais Leahy    Discharge Diagnoses:     Active Hospital Problems    Diagnosis Date Noted    NSTEMI (non-ST elevated myocardial infarction) ([facility] [address] [I21.4] 12/05/2020   Morbidly obese with a BMI of 40  XAVI  Diabetes mellitus type 2 with hyperglycemia  Hyperlipidemia, unspecified  GERD  CAD prior PCI with CABG  Complete heart block with pacemaker      Disposition:  [x] Home  [] Home with home health [] Rehab [] Psych [] SNF  [] LTAC  [] Long term nursing home or group home [] Transfer to ICU  [] Transfer to PCU [] Other:    Magan Alberto MD  [address] [place] [address] Hospital/Cardiology    Schedule an appointment as soon as possible for a visit in 1 week  for hospital follow up       Discharge Condition: stable      Code Status:  Full Code     Activity: activity as tolerated    Diet: DIET CARDIAC; Carb Control: 4 carb choices (60 gms)/meal; No Added Salt (3-4 GM)     Discharge Medications:     Discharge Medication List as of 12/8/2020 11:10 AM           Details   aspirin 81 MG chewable tablet Take 1 tablet by mouth daily, Disp-30 tablet,R-0OTC              Details   isosorbide mononitrate (IMDUR) 60 MG extended release tablet Take 120 mg by mouth dailyHistorical Med      glipiZIDE (GLUCOTROL) 5 MG tablet Take 5 mg by mouth 2 times daily (before meals)Historical Med      apixaban (ELIQUIS) 5 MG TABS tablet Take 5 mg by mouth 2 times dailyHistorical Med      EMPAGLIFLOZIN PO Take 12.5 mg by mouth dailyHistorical Med      clopidogrel (PLAVIX) 75 MG tablet Take 75 mg by mouth dailyHistorical Med      ferrous sulfate 325 (65 Fe) MG tablet Take 325 mg by mouth every other dayHistorical Med      insulin glargine (LANTUS) 100 UNIT/ML injection vial Inject 60 Units into the skin daily Historical Med      METFORMIN HCL ER PO Take 2,000 mg by mouth nightly Historical Med      pantoprazole (PROTONIX) 40 MG tablet Take 40 mg by mouth daily Historical Med      atorvastatin (LIPITOR) 80 MG tablet Take 80 mg by mouth nightly Historical Med      lisinopril (PRINIVIL;ZESTRIL) 40 MG tablet Take 40 mg by mouth dailyHistorical Med      metoprolol succinate (TOPROL XL) 200 MG extended release tablet Take 200 mg by mouth dailyHistorical Med      polyvinyl alcohol (LIQUIFILM TEARS) 1.4 % ophthalmic solution Place 1 drop into both eyes 3 times dailyHistorical Med      cloNIDine (CATAPRES) 0.1 MG tablet Take 0.1 mg by mouth 3 times daily Historical Med      eplerenone (INSPRA) 50 MG tablet Take 50 mg by mouth 2 times dailyHistorical Med      fluticasone (FLONASE) 50 MCG/ACT nasal spray 1 spray by Nasal route dailyHistorical Med      gabapentin (NEURONTIN) 600 MG tablet Take 600 mg by mouth 3 times daily. Historical Med      Magnesium Oxide 420 MG TABS Take 2 tablets by mouth 2 times daily Historical Med      ranolazine (RANEXA) 1000 MG extended release tablet Take 1,000 mg by mouth 2 times daily Historical Med      tamsulosin (FLOMAX) 0.4 MG capsule Take 0.4 mg by mouth nightlyHistorical Med      Cholecalciferol 2000 units TABS Take by mouth dailyHistorical Med      hydrALAZINE (APRESOLINE) 50 MG tablet Take 50 mg by mouth 2 times daily Historical Med             The patient was seen and examined on day of discharge and this discharge summary is in conjunction with any daily progress note from day of discharge. Hospital Course: This is a 24-year-old male with a history of CAD, hyperlipidemia, hypertension, GERD and obesity who presented to the ED with complaints of chest pain at rest.  He initially thought this was related to hypoglycemic as he woke up with a low blood sugar. After he ate the blood sugar improved but then awoke several hours later with continued chest pain. He presented to the ED for further evaluation. Troponins trended x3 and continued to mildly elevate. No EKG changes. Cardiology was consulted. Patient had angiogram performed with arthrectomy to his ramus. He did have several other nonobstructive disease noted. Aspirin was added to his regimen for 30 days. He will continue on Plavix and statin. He will follow-up with the [facility] at the patient's preference. Did well postop without complications. He was discharged home in stable condition to follow-up as an outpatient. He verbalized understanding and was in agreement with the plan of care. Exam:     /76   Pulse 70   Temp 97.5 °F (36.4 °C) (Oral)   Resp 16   Wt 273 lb 9.5 oz (124.1 kg)   SpO2 95%   BMI 40.99 kg/m²     General: Resting in bed in no apparent distress. Overweight, very pleasant  HEENT: Normocephalic. Atraumatic. Pupils equal and reactive. EOM intact. Oral mucosa pink/moist/intact. Neck: Supple. Symmetrical. Trachea midline. Lungs: Clear to auscultation bilaterally. Respirations even and unlabored. Chest: Exam unremarkable. Cardiac: S1/S2 noted. Regular Rhythm and rate. Abdomen/GI: Soft. Non-tender. Non-distended. BS+. Extremities: PP+. Atraumatic. No redness/cyanosis/edema noted. Brisk cap refill. Skin: Dry and intact. No lesions noted. Neuro: Grossly intact. No focal deficits noted. Significant Diagnostic Studies:   Anigogram      Labs:  For convenience and continuity at follow-up the following most recent labs are provided:    CBC:    Lab Results   Component Value Date    WBC 9.5 12/07/2020    HGB 13.7 12/07/2020    HCT 40.8 12/07/2020     12/08/2020       Renal:    Lab Results   Component Value Date     12/08/2020    K 4.3 12/08/2020    K 4.4 12/06/2020     12/08/2020    CO2 24 12/08/2020    BUN 22 12/08/2020    CREATININE 1.1 12/08/2020 CALCIUM 8.8 12/08/2020    PHOS 2.8 12/06/2020       No future appointments. Time Spent on discharge is more than 30 minutes in the examination, evaluation, counseling and review of medications and discharge plan. RX monitoring reviewed     Signed:    BRIAN Hercules CNP   12/8/2020      Thank you [address] [place] [address],  for the opportunity to be involved in this patient's care. If you have any questions or concerns please feel free to contact me at [phone].

## 2020-12-08 NOTE — PROGRESS NOTES
Occupational Therapy   Occupational Therapy Initial Assessment  Date: 2020   Patient Name: Lukas Adames  MRN: [id number]     : 1945    Date of Service: 2020    Discharge Recommendations:  Home with assist PRN  OT Equipment Recommendations  Equipment Needed: No    Lukas Adames scored a 20/24 on the AM-PAC ADL Inpatient form. At this time, no further OT is recommended upon discharge. Recommend patient returns to prior setting with prior services. Assessment   Performance deficits / Impairments: Decreased functional mobility ; Decreased balance;Decreased endurance;Decreased ADL status; Decreased high-level IADLs  Assessment: 70 y.o. male with a past medical history significant for obesity, essential hypertension, hyperlipidemia and CAD with prior CABG () and recent PCI who presented to [facility]  with NSTEMI. Cath lab . PTA pt lived at home with spouse and independent with ADLs and functional mobility. Pt reports he does not use any AD in home but uses walker or scooter for longer distances in community. Today, pt completed functional mobility around room multiple times without AD and CGA/SBA. Pt has chronic foot drop and does not wear braces and has tendency to reach for objects in room to steady self. Pt limited by decreased endurance. Pt completed ADLs with CGA/SBA for standing components. Pt will be safe to return home at discharge. Prognosis: Good  Decision Making: Medium Complexity  OT Education: OT Role;Plan of Care  REQUIRES OT FOLLOW UP: Yes  Activity Tolerance  Activity Tolerance: Patient Tolerated treatment well  Activity Tolerance: denies pain, reports ready to return home  Safety Devices  Safety Devices in place: Yes  Type of devices: Nurse notified;Gait belt;Call light within reach; Left in chair(pt declining chair alarm- RN aware and okay without)           Patient Diagnosis(es): The primary encounter diagnosis was Chest pain, unspecified type.  Diagnoses of Coronary artery disease involving native heart with unstable angina pectoris, unspecified vessel or lesion type (HCC) and Troponin level elevated were also pertinent to this visit. has a past medical history of Arthritis, CAD (coronary artery disease), Erectile dysfunction, GERD (gastroesophageal reflux disease), High blood pressure, Hyperlipidemia, Neuropathy, and Obesity. has a past surgical history that includes Carpal tunnel release; Gallbladder surgery; back surgery (2009); Cholecystectomy; Coronary angioplasty with stent; and Appendectomy (1976). Restrictions  Restrictions/Precautions  Restrictions/Precautions: Fall Risk  Position Activity Restriction  Other position/activity restrictions: B chronic foot drop, Pt. does not wear AFOs    Subjective   General  Chart Reviewed: Yes  Patient assessed for rehabilitation services?: Yes  Additional Pertinent Hx: 70 y.o. male with a past medical history significant for obesity, essential hypertension, hyperlipidemia and CAD with prior CABG (2015) and recent PCI who presented to [facility] 12/5 with NSTEMI. Cath lab 12/7. Family / Caregiver Present: No  Referring Practitioner: Eligio Ragsdale MD  Subjective  Subjective: Pt seen bedside and agreeable to therapy. Pt denied pain throughout and reports ready to go home. General Comment  Comments: Per RN ok for therapy. Social/Functional History  Social/Functional History  Lives With: Spouse  Type of Home: House  Home Layout: One level  Home Access: Level entry  Bathroom Toilet: Standard  Home Equipment: 4 wheeled walker, Electric scooter  Receives Help From: Family  ADL Assistance: Independent  Homemaking Assistance: Needs assistance  Homemaking Responsibilities: No  Ambulation Assistance: Independent(Pt reports in the house he does not use a A. D. but does find he reaches out to balance on objects.   When out of the house, he either uses a rolling walker or scooter depending on how far he needs to go)  Transfer Assistance: Independent  Active : Yes       Objective   Vision: Impaired  Vision Exceptions: Wears glasses for reading  Hearing: Within functional limits    Orientation  Overall Orientation Status: Within Functional Limits     Balance  Sitting Balance: Stand by assistance  Standing Balance: Stand by assistance  Standing Balance  Time: stood prn for toileting and 3-4 min at sink for grooming tasks  Functional Mobility  Functional Mobility Comments: bed > bathroom > chair and chair <> doorway 2x without AD and CGA/SBA- pt has tendency to reach for objects in room to steady self  Toilet Transfers  Toilet - Technique: Ambulating  Equipment Used: Standard toilet  Toilet Transfer: Stand by assistance  Toilet Transfers Comments: grab bar support     ADL  Grooming: Stand by assistance(standing at sink to wash hands and brush teeth)  LE Dressing: (able to cross LE over knee to simulate LB dressing)  Toileting: Stand by assistance  Additional Comments: anticipate pt will require SBA for ADLs based on balance observed        Bed mobility  Supine to Sit: Independent(HOB elevated)  Sit to Supine: (pt in chair at end of session, needs in reach)  Transfers  Sit to stand: Stand by assistance  Stand to sit: Stand by assistance     Cognition  Overall Cognitive Status: WFL  Perception  Overall Perceptual Status: WFL     Sensation  Overall Sensation Status: WFL        LUE AROM (degrees)  LUE General AROM: limited overhead shoulder ROM from arthritis- but functional  Left Hand AROM (degrees)  Left Hand AROM: WFL  RUE AROM (degrees)  RUE General AROM: limited overhead shoulder ROM from arthritis and small rotator cuff tear- but functional  Right Hand AROM (degrees)  Right Hand AROM: WFL       Plan   Plan  Times per week: 3-5  Current Treatment Recommendations: Strengthening, Endurance Training, Balance Training, Gait Training, Functional Mobility Training, Self-Care / ADL    AM-PAC Score  AM-PAC Inpatient Daily Activity Raw Score: 20 (12/08/20 1022)  AM-PAC Inpatient ADL T-Scale Score : 42.03 (12/08/20 1022)  ADL Inpatient CMS 0-100% Score: 38.32 (12/08/20 1022)  ADL Inpatient CMS G-Code Modifier : CJ (12/08/20 1022)    Goals  Short term goals  Time Frame for Short term goals: Prior to DC: Short term goal 1: Pt will complete ADL transfers/mobility independently  Short term goal 2: Pt will tolerate standing > 5 min for functional task with supervision  Short term goal 3: Pt will complete toileting with supervision  Long term goals  Time Frame for Long term goals : stgs=ltgs  Patient Goals   Patient goals : to return home       Therapy Time   Individual Concurrent Group Co-treatment   Time In 0930         Time Out 1010         Minutes 40         Timed Code Treatment Minutes: 25 Minutes     This note to serve as OT d/c summary if pt is d/c-ed prior to next therapy session.     [facility], OTR/L

## 2020-12-08 NOTE — PROCEDURES
[address] [place] [address]                            CARDIAC CATHETERIZATION    PATIENT NAME: Yannick Conn                       :        1945  MED REC NO:   [id number]                          ROOM:       5105  ACCOUNT NO:   [de-identified]                           ADMIT DATE: 2020  PROVIDER:     Saroj Wilkerson MD    DATE OF PROCEDURE:  2020    INDICATION FOR PROCEDURE:  Non-ST-elevation myocardial infarction. The risks and benefits of the procedure were explained to the patient. Informed consent was obtained. He was placed on the cardiac  catheterization table and prepped and draped in sterile fashion. Anesthesia was provided in the volar surface of the left wrist with 1%  lidocaine injected subcutaneously. An Richi's test was performed on the  left wrist to ensure an intact palmar arch. The left radial artery was  accessed and cannulated and a 6-Portuguese sheath inserted using Seldinger  technique. The pre-cocktail of heparin, verapamil and nitroglycerin was  given through the sheath port. Over the 0.035 J-wire, the JL-4 diagnostic catheter was advanced to the  ostium of the left main coronary artery and coronary angiography  performed to this system. The catheter was removed over the wire. Next, the JR-4 catheter was advanced to the ostium of the right coronary  artery and coronary angiography was performed to this system. Catheter  was removed over the wire. Next, the MARCO ANTONIO catheter was advanced to the  ostium of the left internal mammary artery graft down to its anastomosis  with the left anterior descending artery. Angiography was performed. The catheter was removed over the wire. Lastly, the pigtail catheter  was advanced over the wire in the left ventricle.   Left ventricular  end-diastolic pressure measurements were obtained and then a power  injection left ventriculogram was performed. Catheter was flushed and  placed back on pressure and then pulled back across the aortic valve to  assess for a gradient. Catheter was removed over the wire. We decided to intervene upon the subtotal occlusion of the ramus  intermedius branch which was a large vessel. Over the 0.035 J-wire, the EBU3.5 6-Maori guide catheter was advanced  to the ostium of the left main coronary artery. The lesion was wired  with a long Runthrough 0.014 coronary wire. We then ballooned the  lesion with a 2.5 mm balloon. We then removed this and then passed an  intravascular ultrasound catheter distal to the lesion and performed  several pullbacks into the left main coronary artery. This procedure  was performed on IV unfractionated heparin. The patient was on aspirin  and clopidogrel. We elected to perform laser atherectomy throughout the ramus intermedius  branch for in-stent restenosis. We used the coronary laser catheter to perform runs on increasing energy  throughout. We then removed the catheter and ballooned aggressively  with a 2.75 mm x 15 mm noncompliant balloon throughout the ramus  intermedius branch back to the ostium of the left main coronary artery. I removed this and gave intraarterial nitroglycerin. Repeat  intravascular ultrasound demonstrated good apposition of the stent with  expansion. The repeat angiography was performed. We had placed a  FilterWire distal and this was then retrieved. Final angiography was  performed. The guide catheter was removed over the wire. The left radial sheath was removed and a Terumo pressure band applied  for nonocclusive hemostasis. There were no complications from the  procedure. Estimated blood loss was less than 20 mL. The patient received moderate sedation with 5 mg of IV Versed and 300  mcg of fentanyl. He had an ASA grade of III and a Mallampati score of  III. Total duration of sedation was 75 minutes.   Vital signs were  monitored throughout the sedation and remained stable. There were no  complications from the sedation. Estimated blood loss was less than 20 mL. FINDINGS:  1. Right-dominant coronary arterial circulation. There is a 75%  in-stent restenosis in the distal RCA. There is 50% stenosis in the  proximal and mid portions. In the left main, there is a stent in place  by intravascular ultrasound, had approximately 40% in-stent restenosis  with fair expansion. The LIMA graft to the left anterior descending  artery is patent distally. There is a 99% subtotal occlusion in the  ramus branch in-stent. This underwent laser atherectomy followed by a  noncompliant balloon angioplasty. The circumflex has a 60% ostial  stenosis but appears to be in-stent as well. The second obtuse marginal  branch which is a long vessel has a 90% stenosis. 2.  Normal left ventricular systolic function with LV ejection fraction  of 55%. 3.  Normal left ventricular end-diastolic pressure at 10 mmHg. 4.  No gradient across the aortic valve on pullback to suggest aortic  stenosis.         Skye Sheets MD    D: 12/07/2020 17:08:28       T: 12/07/2020 17:11:55     [email or web address]  Job#: [id number]     Doc#: [id number]    CC:  Henrique Combs MD

## 2021-07-07 ENCOUNTER — HOSPITAL ENCOUNTER (INPATIENT)
Age: 76
LOS: 1 days | Discharge: HOME | DRG: 872 | End: 2021-07-08
Attending: STUDENT IN AN ORGANIZED HEALTH CARE EDUCATION/TRAINING PROGRAM | Admitting: INTERNAL MEDICINE
Payer: OTHER GOVERNMENT

## 2021-07-07 ENCOUNTER — APPOINTMENT (OUTPATIENT)
Dept: CT IMAGING | Age: 76
DRG: 872 | End: 2021-07-07
Payer: OTHER GOVERNMENT

## 2021-07-07 DIAGNOSIS — R19.7 DIARRHEA, UNSPECIFIED TYPE: Primary | ICD-10-CM

## 2021-07-07 DIAGNOSIS — E86.0 DEHYDRATION: ICD-10-CM

## 2021-07-07 DIAGNOSIS — A41.9 SEPTICEMIA (HCC): ICD-10-CM

## 2021-07-07 PROBLEM — R11.2 NON-INTRACTABLE VOMITING WITH NAUSEA: Status: ACTIVE | Noted: 2021-07-07

## 2021-07-07 PROBLEM — K92.2 ACUTE GI BLEEDING: Status: ACTIVE | Noted: 2021-07-07

## 2021-07-07 PROBLEM — R11.2 NAUSEA AND VOMITING: Status: ACTIVE | Noted: 2021-07-07

## 2021-07-07 LAB
A/G RATIO: 1.3 (ref 1.1–2.2)
ALBUMIN SERPL-MCNC: 4 G/DL (ref 3.4–5)
ALP BLD-CCNC: 100 U/L (ref 40–129)
ALT SERPL-CCNC: 12 U/L (ref 10–40)
ANION GAP SERPL CALCULATED.3IONS-SCNC: 13 MMOL/L (ref 3–16)
AST SERPL-CCNC: 13 U/L (ref 15–37)
BASOPHILS ABSOLUTE: 0 K/UL (ref 0–0.2)
BASOPHILS RELATIVE PERCENT: 0.2 %
BILIRUB SERPL-MCNC: 0.5 MG/DL (ref 0–1)
BILIRUBIN URINE: ABNORMAL
BLOOD, URINE: NEGATIVE
BUN BLDV-MCNC: 29 MG/DL (ref 7–20)
CALCIUM SERPL-MCNC: 9 MG/DL (ref 8.3–10.6)
CHLORIDE BLD-SCNC: 98 MMOL/L (ref 99–110)
CLARITY: CLEAR
CO2: 23 MMOL/L (ref 21–32)
COLOR: YELLOW
CREAT SERPL-MCNC: 1.4 MG/DL (ref 0.8–1.3)
EOSINOPHILS ABSOLUTE: 0 K/UL (ref 0–0.6)
EOSINOPHILS RELATIVE PERCENT: 0.1 %
GFR AFRICAN AMERICAN: 60
GFR NON-AFRICAN AMERICAN: 50
GLOBULIN: 3.1 G/DL
GLUCOSE BLD-MCNC: 195 MG/DL (ref 70–99)
GLUCOSE URINE: >=1000 MG/DL
HCT VFR BLD CALC: 44.6 % (ref 40.5–52.5)
HEMOGLOBIN: 15.1 G/DL (ref 13.5–17.5)
KETONES, URINE: ABNORMAL MG/DL
LACTIC ACID: 1.8 MMOL/L (ref 0.4–2)
LACTIC ACID: 3.1 MMOL/L (ref 0.4–2)
LEUKOCYTE ESTERASE, URINE: NEGATIVE
LIPASE: 15 U/L (ref 13–60)
LYMPHOCYTES ABSOLUTE: 0.2 K/UL (ref 1–5.1)
LYMPHOCYTES RELATIVE PERCENT: 1.3 %
MAGNESIUM: 1.8 MG/DL (ref 1.8–2.4)
MCH RBC QN AUTO: 32 PG (ref 26–34)
MCHC RBC AUTO-ENTMCNC: 33.9 G/DL (ref 31–36)
MCV RBC AUTO: 94.6 FL (ref 80–100)
MICROSCOPIC EXAMINATION: ABNORMAL
MONOCYTES ABSOLUTE: 0.6 K/UL (ref 0–1.3)
MONOCYTES RELATIVE PERCENT: 3.6 %
NEUTROPHILS ABSOLUTE: 15.3 K/UL (ref 1.7–7.7)
NEUTROPHILS RELATIVE PERCENT: 94.8 %
NITRITE, URINE: NEGATIVE
PDW BLD-RTO: 15.2 % (ref 12.4–15.4)
PH UA: 5 (ref 5–8)
PHOSPHORUS: 2.2 MG/DL (ref 2.5–4.9)
PLATELET # BLD: 230 K/UL (ref 135–450)
PMV BLD AUTO: 6.8 FL (ref 5–10.5)
POTASSIUM REFLEX MAGNESIUM: 4.8 MMOL/L (ref 3.5–5.1)
PROTEIN UA: NEGATIVE MG/DL
RBC # BLD: 4.72 M/UL (ref 4.2–5.9)
SODIUM BLD-SCNC: 134 MMOL/L (ref 136–145)
SPECIFIC GRAVITY UA: >1.03 (ref 1–1.03)
TOTAL PROTEIN: 7.1 G/DL (ref 6.4–8.2)
TROPONIN: 0.03 NG/ML
URINE REFLEX TO CULTURE: ABNORMAL
URINE TYPE: ABNORMAL
UROBILINOGEN, URINE: 0.2 E.U./DL
WBC # BLD: 16.2 K/UL (ref 4–11)
WHITE BLOOD CELLS (WBC), STOOL: ABNORMAL

## 2021-07-07 PROCEDURE — 84100 ASSAY OF PHOSPHORUS: CPT

## 2021-07-07 PROCEDURE — 80053 COMPREHEN METABOLIC PANEL: CPT

## 2021-07-07 PROCEDURE — 6360000002 HC RX W HCPCS: Performed by: INTERNAL MEDICINE

## 2021-07-07 PROCEDURE — 87505 NFCT AGENT DETECTION GI: CPT

## 2021-07-07 PROCEDURE — 83630 LACTOFERRIN FECAL (QUAL): CPT

## 2021-07-07 PROCEDURE — 6360000002 HC RX W HCPCS: Performed by: STUDENT IN AN ORGANIZED HEALTH CARE EDUCATION/TRAINING PROGRAM

## 2021-07-07 PROCEDURE — 2580000003 HC RX 258: Performed by: INTERNAL MEDICINE

## 2021-07-07 PROCEDURE — 1200000000 HC SEMI PRIVATE

## 2021-07-07 PROCEDURE — 84484 ASSAY OF TROPONIN QUANT: CPT

## 2021-07-07 PROCEDURE — 36415 COLL VENOUS BLD VENIPUNCTURE: CPT

## 2021-07-07 PROCEDURE — 83605 ASSAY OF LACTIC ACID: CPT

## 2021-07-07 PROCEDURE — 6360000004 HC RX CONTRAST MEDICATION: Performed by: STUDENT IN AN ORGANIZED HEALTH CARE EDUCATION/TRAINING PROGRAM

## 2021-07-07 PROCEDURE — 96374 THER/PROPH/DIAG INJ IV PUSH: CPT

## 2021-07-07 PROCEDURE — 87040 BLOOD CULTURE FOR BACTERIA: CPT

## 2021-07-07 PROCEDURE — 99285 EMERGENCY DEPT VISIT HI MDM: CPT

## 2021-07-07 PROCEDURE — 6370000000 HC RX 637 (ALT 250 FOR IP): Performed by: INTERNAL MEDICINE

## 2021-07-07 PROCEDURE — 83690 ASSAY OF LIPASE: CPT

## 2021-07-07 PROCEDURE — 93005 ELECTROCARDIOGRAM TRACING: CPT | Performed by: STUDENT IN AN ORGANIZED HEALTH CARE EDUCATION/TRAINING PROGRAM

## 2021-07-07 PROCEDURE — 96361 HYDRATE IV INFUSION ADD-ON: CPT

## 2021-07-07 PROCEDURE — 96375 TX/PRO/DX INJ NEW DRUG ADDON: CPT

## 2021-07-07 PROCEDURE — 74177 CT ABD & PELVIS W/CONTRAST: CPT

## 2021-07-07 PROCEDURE — 85025 COMPLETE CBC W/AUTO DIFF WBC: CPT

## 2021-07-07 PROCEDURE — 81003 URINALYSIS AUTO W/O SCOPE: CPT

## 2021-07-07 PROCEDURE — 2580000003 HC RX 258: Performed by: STUDENT IN AN ORGANIZED HEALTH CARE EDUCATION/TRAINING PROGRAM

## 2021-07-07 PROCEDURE — 83735 ASSAY OF MAGNESIUM: CPT

## 2021-07-07 RX ORDER — ASPIRIN 81 MG/1
81 TABLET, CHEWABLE ORAL DAILY
Status: DISCONTINUED | OUTPATIENT
Start: 2021-07-07 | End: 2021-07-08 | Stop reason: HOSPADM

## 2021-07-07 RX ORDER — DEXTROSE MONOHYDRATE 50 MG/ML
100 INJECTION, SOLUTION INTRAVENOUS PRN
Status: DISCONTINUED | OUTPATIENT
Start: 2021-07-07 | End: 2021-07-08 | Stop reason: HOSPADM

## 2021-07-07 RX ORDER — FLUTICASONE PROPIONATE 50 MCG
1 SPRAY, SUSPENSION (ML) NASAL DAILY
Status: DISCONTINUED | OUTPATIENT
Start: 2021-07-08 | End: 2021-07-08 | Stop reason: HOSPADM

## 2021-07-07 RX ORDER — INSULIN GLARGINE 100 [IU]/ML
60 INJECTION, SOLUTION SUBCUTANEOUS DAILY
Status: DISCONTINUED | OUTPATIENT
Start: 2021-07-08 | End: 2021-07-08 | Stop reason: HOSPADM

## 2021-07-07 RX ORDER — RANOLAZINE 500 MG/1
1000 TABLET, EXTENDED RELEASE ORAL 2 TIMES DAILY
Status: DISCONTINUED | OUTPATIENT
Start: 2021-07-07 | End: 2021-07-08 | Stop reason: HOSPADM

## 2021-07-07 RX ORDER — METHOCARBAMOL 750 MG/1
750 TABLET, FILM COATED ORAL 3 TIMES DAILY PRN
COMMUNITY

## 2021-07-07 RX ORDER — PANTOPRAZOLE SODIUM 40 MG/1
40 TABLET, DELAYED RELEASE ORAL DAILY
Status: DISCONTINUED | OUTPATIENT
Start: 2021-07-07 | End: 2021-07-08 | Stop reason: HOSPADM

## 2021-07-07 RX ORDER — TAMSULOSIN HYDROCHLORIDE 0.4 MG/1
0.4 CAPSULE ORAL NIGHTLY
Status: DISCONTINUED | OUTPATIENT
Start: 2021-07-07 | End: 2021-07-08 | Stop reason: HOSPADM

## 2021-07-07 RX ORDER — ONDANSETRON 2 MG/ML
4 INJECTION INTRAMUSCULAR; INTRAVENOUS ONCE
Status: COMPLETED | OUTPATIENT
Start: 2021-07-07 | End: 2021-07-07

## 2021-07-07 RX ORDER — SODIUM CHLORIDE 9 MG/ML
INJECTION, SOLUTION INTRAVENOUS CONTINUOUS
Status: DISCONTINUED | OUTPATIENT
Start: 2021-07-07 | End: 2021-07-08

## 2021-07-07 RX ORDER — ACETAMINOPHEN 325 MG/1
650 TABLET ORAL EVERY 6 HOURS PRN
Status: DISCONTINUED | OUTPATIENT
Start: 2021-07-07 | End: 2021-07-08 | Stop reason: HOSPADM

## 2021-07-07 RX ORDER — CLONIDINE HYDROCHLORIDE 0.1 MG/1
0.1 TABLET ORAL 3 TIMES DAILY
Status: DISCONTINUED | OUTPATIENT
Start: 2021-07-07 | End: 2021-07-08 | Stop reason: HOSPADM

## 2021-07-07 RX ORDER — CLOPIDOGREL BISULFATE 75 MG/1
75 TABLET ORAL DAILY
Status: DISCONTINUED | OUTPATIENT
Start: 2021-07-08 | End: 2021-07-08 | Stop reason: HOSPADM

## 2021-07-07 RX ORDER — DEXTROSE MONOHYDRATE 25 G/50ML
12.5 INJECTION, SOLUTION INTRAVENOUS PRN
Status: DISCONTINUED | OUTPATIENT
Start: 2021-07-07 | End: 2021-07-08 | Stop reason: HOSPADM

## 2021-07-07 RX ORDER — SODIUM CHLORIDE 0.9 % (FLUSH) 0.9 %
5-40 SYRINGE (ML) INJECTION PRN
Status: DISCONTINUED | OUTPATIENT
Start: 2021-07-07 | End: 2021-07-08 | Stop reason: HOSPADM

## 2021-07-07 RX ORDER — 0.9 % SODIUM CHLORIDE 0.9 %
1000 INTRAVENOUS SOLUTION INTRAVENOUS ONCE
Status: COMPLETED | OUTPATIENT
Start: 2021-07-07 | End: 2021-07-07

## 2021-07-07 RX ORDER — SODIUM CHLORIDE 9 MG/ML
25 INJECTION, SOLUTION INTRAVENOUS PRN
Status: DISCONTINUED | OUTPATIENT
Start: 2021-07-07 | End: 2021-07-08 | Stop reason: HOSPADM

## 2021-07-07 RX ORDER — POLYETHYLENE GLYCOL 3350 17 G/17G
17 POWDER, FOR SOLUTION ORAL DAILY PRN
Status: DISCONTINUED | OUTPATIENT
Start: 2021-07-07 | End: 2021-07-08 | Stop reason: HOSPADM

## 2021-07-07 RX ORDER — ONDANSETRON 2 MG/ML
4 INJECTION INTRAMUSCULAR; INTRAVENOUS EVERY 6 HOURS PRN
Status: DISCONTINUED | OUTPATIENT
Start: 2021-07-07 | End: 2021-07-08 | Stop reason: HOSPADM

## 2021-07-07 RX ORDER — NICOTINE POLACRILEX 4 MG
15 LOZENGE BUCCAL PRN
Status: DISCONTINUED | OUTPATIENT
Start: 2021-07-07 | End: 2021-07-08 | Stop reason: HOSPADM

## 2021-07-07 RX ORDER — ONDANSETRON 4 MG/1
4 TABLET, ORALLY DISINTEGRATING ORAL EVERY 8 HOURS PRN
Status: DISCONTINUED | OUTPATIENT
Start: 2021-07-07 | End: 2021-07-08 | Stop reason: HOSPADM

## 2021-07-07 RX ORDER — FENTANYL CITRATE 50 UG/ML
50 INJECTION, SOLUTION INTRAMUSCULAR; INTRAVENOUS ONCE
Status: COMPLETED | OUTPATIENT
Start: 2021-07-07 | End: 2021-07-07

## 2021-07-07 RX ORDER — SODIUM CHLORIDE 0.9 % (FLUSH) 0.9 %
5-40 SYRINGE (ML) INJECTION EVERY 12 HOURS SCHEDULED
Status: DISCONTINUED | OUTPATIENT
Start: 2021-07-07 | End: 2021-07-08 | Stop reason: HOSPADM

## 2021-07-07 RX ORDER — GABAPENTIN 300 MG/1
600 CAPSULE ORAL 3 TIMES DAILY
Status: DISCONTINUED | OUTPATIENT
Start: 2021-07-07 | End: 2021-07-08 | Stop reason: HOSPADM

## 2021-07-07 RX ORDER — EPLERENONE 25 MG/1
50 TABLET, FILM COATED ORAL 2 TIMES DAILY
Status: DISCONTINUED | OUTPATIENT
Start: 2021-07-07 | End: 2021-07-08 | Stop reason: HOSPADM

## 2021-07-07 RX ORDER — ATORVASTATIN CALCIUM 80 MG/1
80 TABLET, FILM COATED ORAL NIGHTLY
Status: DISCONTINUED | OUTPATIENT
Start: 2021-07-07 | End: 2021-07-08 | Stop reason: HOSPADM

## 2021-07-07 RX ORDER — OXYCODONE HYDROCHLORIDE 5 MG/1
5 TABLET ORAL EVERY 4 HOURS PRN
Status: DISCONTINUED | OUTPATIENT
Start: 2021-07-07 | End: 2021-07-08 | Stop reason: HOSPADM

## 2021-07-07 RX ORDER — OXYCODONE HYDROCHLORIDE 10 MG/1
10 TABLET ORAL EVERY 4 HOURS PRN
Status: DISCONTINUED | OUTPATIENT
Start: 2021-07-07 | End: 2021-07-08 | Stop reason: HOSPADM

## 2021-07-07 RX ORDER — ACETAMINOPHEN 650 MG/1
650 SUPPOSITORY RECTAL EVERY 6 HOURS PRN
Status: DISCONTINUED | OUTPATIENT
Start: 2021-07-07 | End: 2021-07-08 | Stop reason: HOSPADM

## 2021-07-07 RX ADMIN — SODIUM CHLORIDE 1000 ML: 9 INJECTION, SOLUTION INTRAVENOUS at 12:55

## 2021-07-07 RX ADMIN — CLONIDINE HYDROCHLORIDE 0.1 MG: 0.1 TABLET ORAL at 20:56

## 2021-07-07 RX ADMIN — GABAPENTIN 600 MG: 300 CAPSULE ORAL at 20:56

## 2021-07-07 RX ADMIN — SODIUM CHLORIDE 1000 ML: 9 INJECTION, SOLUTION INTRAVENOUS at 14:45

## 2021-07-07 RX ADMIN — APIXABAN 5 MG: 5 TABLET, FILM COATED ORAL at 21:48

## 2021-07-07 RX ADMIN — RANOLAZINE 1000 MG: 500 TABLET, FILM COATED, EXTENDED RELEASE ORAL at 20:56

## 2021-07-07 RX ADMIN — OXYCODONE HYDROCHLORIDE 10 MG: 10 TABLET ORAL at 18:59

## 2021-07-07 RX ADMIN — ATORVASTATIN CALCIUM 80 MG: 80 TABLET, FILM COATED ORAL at 20:57

## 2021-07-07 RX ADMIN — EPLERENONE 50 MG: 25 TABLET, FILM COATED ORAL at 20:56

## 2021-07-07 RX ADMIN — SODIUM CHLORIDE 1500 MG: 900 INJECTION INTRAVENOUS at 20:56

## 2021-07-07 RX ADMIN — TAMSULOSIN HYDROCHLORIDE 0.4 MG: 0.4 CAPSULE ORAL at 20:56

## 2021-07-07 RX ADMIN — IOPAMIDOL 75 ML: 755 INJECTION, SOLUTION INTRAVENOUS at 13:48

## 2021-07-07 RX ADMIN — FENTANYL CITRATE 50 MCG: 50 INJECTION, SOLUTION INTRAMUSCULAR; INTRAVENOUS at 12:56

## 2021-07-07 RX ADMIN — SODIUM CHLORIDE: 9 INJECTION, SOLUTION INTRAVENOUS at 18:30

## 2021-07-07 RX ADMIN — PANTOPRAZOLE SODIUM 40 MG: 40 TABLET, DELAYED RELEASE ORAL at 21:48

## 2021-07-07 RX ADMIN — ONDANSETRON 4 MG: 2 INJECTION INTRAMUSCULAR; INTRAVENOUS at 12:56

## 2021-07-07 ASSESSMENT — PAIN DESCRIPTION - ONSET: ONSET: ON-GOING

## 2021-07-07 ASSESSMENT — PAIN SCALES - GENERAL
PAINLEVEL_OUTOF10: 9
PAINLEVEL_OUTOF10: 5

## 2021-07-07 ASSESSMENT — PAIN DESCRIPTION - LOCATION
LOCATION: ABDOMEN;BACK;NECK
LOCATION: BACK

## 2021-07-07 ASSESSMENT — PAIN DESCRIPTION - PAIN TYPE
TYPE: CHRONIC PAIN
TYPE: CHRONIC PAIN

## 2021-07-07 ASSESSMENT — PAIN DESCRIPTION - PROGRESSION: CLINICAL_PROGRESSION: NOT CHANGED

## 2021-07-07 ASSESSMENT — PAIN - FUNCTIONAL ASSESSMENT
PAIN_FUNCTIONAL_ASSESSMENT: PREVENTS OR INTERFERES SOME ACTIVE ACTIVITIES AND ADLS
PAIN_FUNCTIONAL_ASSESSMENT: PREVENTS OR INTERFERES SOME ACTIVE ACTIVITIES AND ADLS

## 2021-07-07 ASSESSMENT — PAIN DESCRIPTION - FREQUENCY
FREQUENCY: CONTINUOUS
FREQUENCY: CONTINUOUS

## 2021-07-07 ASSESSMENT — PAIN DESCRIPTION - DESCRIPTORS
DESCRIPTORS: ACHING
DESCRIPTORS: ACHING;DISCOMFORT

## 2021-07-07 NOTE — ED PROVIDER NOTES
Primary Care Physician: No primary care provider on file. Attending Physician: No att. providers found     History   Chief Complaint   Patient presents with    Abdominal Pain     Pt states he has had nausea, vomiting, and diarrhea since last night around 10 pm.  Pt states he has abdominal pain but has had it for months. HPI   Aldon Crigler is a 78 y.o. male history of hypertension, hyperlipidemia, coronary artery disease status post CABG obesity presenting this afternoon accompanied by wife complaining of nausea vomiting associated with diarrhea. Stated symptoms started last night at around 10 PM after he had some bleeding. He does also have some abdominal pain which is significant mostly on the right lower quadrant. He denies any fevers chest pain or shortness of breath. Marysanae Fend that he is very dizzy and feel like he is going to pass out. Past Medical History:   Diagnosis Date    Arthritis     CAD (coronary artery disease)     Erectile dysfunction     GERD (gastroesophageal reflux disease)     High blood pressure     Hyperlipidemia     Neuropathy     disc diseae of back with sciatica.     Obesity         Past Surgical History:   Procedure Laterality Date    APPENDECTOMY      BACK SURGERY  2009    CARPAL TUNNEL RELEASE      CHOLECYSTECTOMY      CORONARY ANGIOPLASTY WITH STENT PLACEMENT      5 stents    GALLBLADDER SURGERY          Family History   Problem Relation Age of Onset    Kidney Disease Mother     Diabetes Mother     Heart Failure Father         Social History     Socioeconomic History    Marital status:      Spouse name: None    Number of children: None    Years of education: None    Highest education level: None   Occupational History    None   Tobacco Use    Smoking status: Former Smoker     Packs/day: 0.50     Years: 1.00     Pack years: 0.50     Quit date: 5/3/1970     Years since quittin.2    Smokeless tobacco: Never Used   Substance and Sexual Activity    Alcohol use: No    Drug use: No    Sexual activity: None   Other Topics Concern    None   Social History Narrative    None     Social Determinants of Health     Financial Resource Strain:     Difficulty of Paying Living Expenses:    Food Insecurity:     Worried About Running Out of Food in the Last Year:     [address] in the Last Year:    Transportation Needs:     Lack of Transportation (Medical):  Lack of Transportation (Non-Medical):    Physical Activity:     Days of Exercise per Week:     Minutes of Exercise per Session:    Stress:     Feeling of Stress :    Social Connections:     Frequency of Communication with Friends and Family:     Frequency of Social Gatherings with Friends and Family:     Attends [employer]arsani Services:     Active Member of Clubs or Organizations:     Attends Club or Organization Meetings:     Marital Status:    Intimate Partner Violence:     Fear of Current or Ex-Partner:     Emotionally Abused:     Physically Abused:     Sexually Abused:         Review of Systems   10 total systems reviewed and found to be negative unless otherwise noted in HPI     Physical Exam   /73   Pulse 71   Temp 98 °F (36.7 °C) (Oral)   Resp 18   Ht 5' 8\" (1.727 m)   Wt 281 lb 11 oz (127.8 kg)   SpO2 93%   BMI 42.83 kg/m²      CONSTITUTIONAL: ill appearing, in no acute distress   HEAD: atraumatic, normocephalic   EYES: PERRL, No injection, discharge or scleral icterus. ENT: Moist mucous membranes. NECK: Normal ROM, NO LAD   CARDIOVASCULAR: Regular rate and rhythm. No murmurs or gallop. PULMONARY/CHEST: Airway patent. No retractions. Breath sounds clear with good air entry bilaterally. ABDOMEN: Soft, Non-distended and non-tender, without guarding or rebound. SKIN: Acyanotic, warm, dry   MUSCULOSKELETAL: No swelling, tenderness or deformity   NEUROLOGICAL: Awake and oriented x 3. Pulses intact. Grossly nonfocal   Nursing note and vitals reviewed.      ED Course & Medical Decision Making   Medications   0.9 % sodium chloride bolus (0 mLs Intravenous Stopped 7/7/21 1429)   ondansetron (ZOFRAN) injection 4 mg (4 mg Intravenous Given 7/7/21 1256)   fentaNYL (SUBLIMAZE) injection 50 mcg (50 mcg Intravenous Given 7/7/21 1256)   iopamidol (ISOVUE-370) 76 % injection 75 mL (75 mLs Intravenous Given 7/7/21 1348)   0.9 % sodium chloride bolus (0 mLs Intravenous Stopped 7/7/21 1741)      Labs Reviewed   FECAL LEUKOCYTES - Abnormal; Notable for the following components:       Result Value    White Blood Cells (WBC), Stool   (*)     Value: POSITIVE  Normal Range:Negative      All other components within normal limits    Narrative:     ORDER#: [id number]                          ORDERED BY: Audelia Conklin  SOURCE: Stool                              COLLECTED:  07/07/21 18:50  ANTIBIOTICS AT LUZ.:                      RECEIVED :  07/07/21 19:01  Performed at:  [facility]  [address] [place] [address] [place]   Phone [phone]   CBC WITH AUTO DIFFERENTIAL - Abnormal; Notable for the following components:    WBC 16.2 (*)     Neutrophils Absolute 15.3 (*)     Lymphocytes Absolute 0.2 (*)     All other components within normal limits    Narrative:     Performed at:  [address] [place]   Phone [phone]   COMPREHENSIVE METABOLIC PANEL W/ REFLEX TO MG FOR LOW K - Abnormal; Notable for the following components:    Sodium 134 (*)     Chloride 98 (*)     Glucose 195 (*)     BUN 29 (*)     CREATININE 1.4 (*)     GFR Non- 50 (*)     GFR  60 (*)     AST 13 (*)     All other components within normal limits    Narrative:     Performed at:  [address] [place]   Phone [phone]   TROPONIN - Abnormal; Notable for the following components:    Troponin 0.03 (*)     All other components within normal limits    Narrative:     Performed at:  [facility]  [address], [facility] [employer]   Phone [phone]   LACTIC ACID, PLASMA - Abnormal; Notable for the following components:    Lactic Acid 3.1 (*)     All other components within normal limits    Narrative:     Performed at:  [facility]  [address], [facility] [employer] [address]   Phone [phone]   URINE RT REFLEX TO CULTURE - Abnormal; Notable for the following components:    Glucose, Ur >=1000 (*)     Bilirubin Urine SMALL (*)     Ketones, Urine TRACE (*)     All other components within normal limits    Narrative:     Performed at:  [facility]  [address], [facility] [employer] [address]   Phone [phone]   PHOSPHORUS - Abnormal; Notable for the following components:    Phosphorus 2.2 (*)     All other components within normal limits    Narrative:     Performed at:  [facility]  [address], [facility] [employer] [address]   Phone [phone]   COMPREHENSIVE METABOLIC PANEL W/ REFLEX TO MG FOR LOW K - Abnormal; Notable for the following components:    Glucose 125 (*)     BUN 36 (*)     CREATININE 1.8 (*)     GFR Non- 37 (*)     GFR  45 (*)     Calcium 8.0 (*)     Total Protein 5.8 (*)     Albumin 3.2 (*)     AST 14 (*)     All other components within normal limits    Narrative:     Performed at:  [facility]  [address], [facility] [employer]   Phone [phone]   CBC WITH AUTO DIFFERENTIAL - Abnormal; Notable for the following components:    RBC 3.99 (*)     Hemoglobin 12.7 (*)     Hematocrit 38.7 (*)     Lymphocytes Absolute 0.6 (*)     All other components within normal limits    Narrative:     Performed at:  [facility]  [address] [place] [address] [facility] [employer]   Phone [phone]   BASIC METABOLIC PANEL - Abnormal; Notable for the following components:    Glucose 117 (*)     BUN 32 (*)     CREATININE 1.5 (*)     GFR Non- 46 (*)     GFR African American 55 (*)     Calcium 8.1 (*)     All other components within normal limits    Narrative:     Performed at:  [address] [employer] [address]   Phone [phone]   POCT GLUCOSE - Abnormal; Notable for the following components:    POC Glucose 144 (*)     All other components within normal limits    Narrative:     Performed at:  [address] [facility] [employer] [address]   Phone [phone]   POCT GLUCOSE - Abnormal; Notable for the following components:    POC Glucose 123 (*)     All other components within normal limits    Narrative:     Performed at:  [address] [facility] [employer] [address]   Phone [phone]   POCT GLUCOSE - Abnormal; Notable for the following components:    POC Glucose 120 (*)     All other components within normal limits    Narrative:     Performed at:  [facility]  [address], [facility] [employer] [address]   Phone [phone]   CULTURE, BLOOD 1    Narrative:     ORDER#: [id number]                          ORDERED BY: Sandeep Renee  SOURCE: Blood                              COLLECTED:  07/07/21 18:42  ANTIBIOTICS AT LUZ.:                      RECEIVED :  07/07/21 18:46  If child <=2 yrs old please draw pediatric bottle. ~Blood Culture 1  Performed at:  [address] [facility] [employer] [address]   Phone [phone]   CULTURE, BLOOD 2    Narrative:     ORDER#: [id number]                          ORDERED BY: Sandeep Renee  SOURCE: Blood                              COLLECTED:  07/07/21 18:42  ANTIBIOTICS AT LUZ.:                      RECEIVED :  07/07/21 18:46  If child <=2 yrs old please draw pediatric bottle. ~Blood Culture #2  Performed at:  [facility]  [address] [facility] [place] [address]   Phone [phone]   GASTROINTESTINAL PANEL, MOLECULAR    Narrative:     ORDER#: [id number]                          ORDERED BY: Missy Gonzalez  SOURCE: Stool                              COLLECTED:  07/07/21 18:50  ANTIBIOTICS AT LUZ.:                      RECEIVED :  07/07/21 19:01  Performed at:  [facility]  [address] [facility] [place] [address]   Phone [phone]   LIPASE    Narrative:     Performed at:  [facility]  [address] [facility] [place] [address]   Phone [phone]   MAGNESIUM    Narrative:     Performed at:  [facility]  [address], [place] [address]   Phone [phone]   LACTIC ACID, PLASMA    Narrative:     Performed at:  [facility]  [address] [facility] [place] [address]   Phone [phone]   LACTIC ACID, PLASMA    Narrative:     Performed at:  [facility]  [address] [facility] [place] [address]   Phone [phone]   LACTIC ACID, PLASMA    Narrative:     Performed at:  [facility]  [address] [facility] [place] [address]   Phone [phone]   POCT GLUCOSE   POCT GLUCOSE   POCT GLUCOSE   POCT GLUCOSE      CT ABDOMEN PELVIS W IV CONTRAST Additional Contrast? None   Final Result   No evidence of acute process in the abdomen/pelvis with numerous incidental   findings as discussed. 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 If referable   symptomatology further evaluation with CT angiogram may be considered.             NM MYOCARDIAL SPECT GXT STRESS ONLY    Result Date: 6/22/2021  Acquisition device:  CASE  Protocol Name:  LexiScan  Exercise Time:  00:07:24  Reason for Test: ISCHEMIC CARDIOMYOPATHY   RESTING ECG :     Resting Blood Pressure:  157&61  mmHg  DURING EXERCISE :     Maximum Blood Pressure:  157&61  mmHg Maximum Heart Rate:  179  BPM :  Yes  Target HR Reached:  Yes   POST EXERCIS:     :  No  Final Blood Pressure:  1.0  mmHg Interpretation:  Ecg interpretation included with stress nuclear perfusion scan. ECG strips stored in MUSE. ^ Confirmed by FIDELIA BRENNAN, Kendra Singer [phone]),  Saleem Gaytan ([id number]) on 6/22/2021 7:28:39 AM     PET MYOCARDIAL VIABILITY    Result Date: 6/21/2021  FINAL INTERPRETATION Abnormal study with evidence of ischemia and scar. There is no left ventricular enlargement with mildly reduced global left ventricular systolic function at stress only. Regional wall motion abnormalities are characterized below. Subdiaphragmatic attenuation is noted. Overall study quality is good. Scan significance indicates high cardiac risk. Compared to the prior study dated 2.8.21, there has been interval decline in left ventricular systolic function. The  perfusion defect remains largely unchanged. Findings were discussed with Dr. Elina Jaeger at 2202. PERFUSION FINDINGS There is a moderate to large sized area of mildly decreased perfusion in the inferolateral and basal to mid inferior segments at stress with significant improvement at rest consistent with predominant ischemia. There is a small to medium sized area of mildly decreased perfusion in mid anteroseptal, apical anterior and apical septal segments at rest with improvement at stress  most consistent with nontransmural infarction. However, this finding may also be present secondary to the LBBB. The sum stress score is 8 (normal: less than 4, mildly abnormal: 4-8, severely abnormal: greater than 8). MYOCARDIAL FLOW RESERVE There is overall preserved myocardial flow reserve with relative impairment in the LCx territory.                                                                       LV          LAD        LCx RCA Resting myocardial blood flow (ml/min/g)      0.84        0.86       0.94        0.73 Stress myocardial blood flow (ml/min/g)        2.80       3.23       2.39        2.45 Myocardial flow reserve                                3.52       4.06       2.54        3.55     ATTENUATION CT IMAGING There is heavy, three vessel coronary artery calcification with evidence of stenting. Calcified atherosclerotic plaque is noted in the descending aorta. Pacing leads are seen entering the right heart from the SVC. FUNCTION FINDINGS The left ventricle is normal in size with mildly reduced global left ventricular systolic function during stress only. Septal dyssynchrony is present at rest and stress. Inferolateral  hypokinesis is present, more obvious during stress. There is no visual evidence of transient ischemic dilation and a normal stress/rest left ventricular volume ratio of 1.12. REST:          Ejection Fraction 55%                     End Diastolic Volume 481 mL                     End Systolic Volume 48 mL. STRESS:     Ejection Fraction 46%                     End Diastolic Volume 070 mL                     End Systolic Volume 68 mL The right ventricle is normal. PHARMACOLOGIC (REGADENOSON) PET/CT TECHNIQUE Indication: The patient is referred for the evaluation of Ischemic Cardiomyopathy. The patient was not experiencing chest pain at the time of the study. REST: Gated cardiac PET rest imaging with CT attenuation correction was performed at 1121. CQ-34:  19.90 millicuries                  Rest Hemodynamics:  Heart Rate 65 bpm, Blood Pressure 137/61 mmHg STRESS: The patient received 0.4 milligrams of regadenoson IV push and experienced dyspnea during stress testing.   Symptoms resolved after administration of 100mg IV aminophylline Gated cardiac PET stress imaging with CT attenuation correction was performed at 1138                  ZT-51:  36.56 millicuries                  Stress flow (ml/min/g)      0.84        0.86       0.94        0.73 Stress myocardial blood flow (ml/min/g)        2.80       3.23       2.39        2.45 Myocardial flow reserve                                3.52       4.06       2.54        3.55     ATTENUATION CT IMAGING There is heavy, three vessel coronary artery calcification with evidence of stenting. Calcified atherosclerotic plaque is noted in the descending aorta. Pacing leads are seen entering the right heart from the SVC. FUNCTION FINDINGS The left ventricle is normal in size with mildly reduced global left ventricular systolic function during stress only. Septal dyssynchrony is present at rest and stress. Inferolateral  hypokinesis is present, more obvious during stress. There is no visual evidence of transient ischemic dilation and a normal stress/rest left ventricular volume ratio of 1.12. REST:          Ejection Fraction 55%                     End Diastolic Volume 957 mL                     End Systolic Volume 48 mL. STRESS:     Ejection Fraction 46%                     End Diastolic Volume 046 mL                     End Systolic Volume 68 mL The right ventricle is normal. PHARMACOLOGIC (REGADENOSON) PET/CT TECHNIQUE Indication: The patient is referred for the evaluation of Ischemic Cardiomyopathy. The patient was not experiencing chest pain at the time of the study. REST: Gated cardiac PET rest imaging with CT attenuation correction was performed at 1121. XI-85:  28.04 millicuries                  Rest Hemodynamics:  Heart Rate 65 bpm, Blood Pressure 137/61 mmHg STRESS: The patient received 0.4 milligrams of regadenoson IV push and experienced dyspnea during stress testing.   Symptoms resolved after administration of 100mg IV aminophylline Gated cardiac PET stress imaging with CT attenuation correction was performed at 1138                  IY-37:  09.38 millicuries                  Stress Hemodynamics:  Heart Rate 86 bpm, Blood Pressure 124/51 mmHg ECG DATA: Rest ECG: Sinus rhythm, atrial sensed ventricular paced Stress ECG: Sinus rhythm, atrial sensed ventricular paced ECG findings (only): Nondiagnostic ECG due to left bundle branch block Participating fellow: Grant Rivers Please refer to the FINAL SCAN INTERPRETATION at the top of this report. I have personally reviewed the images and I agree with this report. Report Verified by: Isabella Melendez MD at 6/21/2021 3:08 PM EDT        EKG INTERPRETATION:  EKG by my preliminary interpretation shows sinus rhythm with rate of 93, normal axis, normal intervals, with no ST changes indicative of ischemia at this time. PROCEDURES:   Procedures    ASSESSMENT AND PLAN:  Kenzie Orlando is a 78 y.o. male history of hypertension, hyperlipidemia, coronary artery disease status post CABG obesity presenting this afternoon accompanied by wife complaining of nausea vomiting associated with diarrhea. Stated symptoms started last night at around 10 PM after he had some bleeding. He does also have some abdominal pain which is significant mostly on the right lower quadrant. On exam patient appears to be in distress and tender to palpation right lower quadrant with soft blood pressures in the 80s. Given his presentation I was concerned for abdominal pathology and I did obtain imaging studies as well as labs. Labs so far shows elevated troponin which is chronic. He is white count is elevated as well as creatinine which I believe might be secondary to dehydration from his diarrhea. Given the soft blood pressures patient was given 2 L of IV fluid with some mild improvement. The rest of the work-up including imaging studies unremarkable. However despite this findings on labs and imaging studies I am recommending the patient be admitted for near syncope with low blood pressures which I believe is from his dehydration.   Orthostatic blood pressures are pending hospitalist has been consulted for admission. .    ClINICAL IMPRESSION:  1. Diarrhea, unspecified type    2. Dehydration    3. Septicemia [facility])          DISPOSITION    Hospitalist admit   -Findings and recommendations explained to patient. He expressed understanding and agreed with the plan. Antolin Pino MD (electronically signed)  7/8/2021  _________________________________________________________________________________________  Amount and/or Complexity of Data Reviewed:  Clinical lab tests: ordered and reviewed   Tests in the radiology section of CPT®: ordered and reviewed   Tests in the medicine section of CPT®: ordered and reviewed   Decide to obtain previous medical records or to obtain history from someone other than the patient: no  Obtain history from someone other than the patient: no  Review and summarize past medical records:yes  I looked up the patient in our electronic medical record:yes  Discuss the patient with other providers:yes  Independent visualization of images, tracings, or specimens:yes  Risk of Complications, Morbidity, and/or Mortality:Moderate  Presenting problems: moderate Diagnostic procedures: moderate yes Management options: moderate     Critical Care Attestation   Total critical care time: 35 minutes minimum   Critical care time does not include separately billable procedures and treating other patients. Critical care was necessary to treat or prevent imminent or life-threatening deterioration of the following conditions: Diarrhea concerning for abscess patient resuscitated in the emergency room and admitted to the hospital service. . Case discussed with consultants.        _________________________________________________________________________________________  This record is transcribed utilizing voice recognition technology. There are inherent limitations in this technology. In addition, there may be limitations in editing of this report. If there are any discrepancies, please contact me directly. Riana Fernandez MD  07/08/21 1722

## 2021-07-07 NOTE — ED NOTES
Bed: B-08  Expected date:   Expected time:   Means of arrival: [facility]  Comments:  N,v,d     Melinda Gutierrez RN  07/07/21 1055

## 2021-07-07 NOTE — ED NOTES
Bed: B-04  Expected date:   Expected time:   Means of arrival:   Comments:  [address] [place] [address]  07/07/21 5183

## 2021-07-07 NOTE — Clinical Note
Patient Class: Inpatient [101]   REQUIRED: Diagnosis: Acute GI bleeding [857998]   Estimated Length of Stay: Estimated stay of more than 2 midnights

## 2021-07-07 NOTE — ED NOTES
Handoff report given to Derick Hendrix RN tot assume care. No further questions at this time. I will place transport to room 4123. If any questions arise please call [phone].      Jennifer Mckoy RN  07/07/21 4865

## 2021-07-07 NOTE — H&P
Hospital Medicine History & Physical      PCP: No primary care provider on file. Date of Admission: 7/7/2021    Date of Service: Pt seen/examined on 7/7/21 and Admitted to Inpatient     Chief Complaint:  N/V    History Of Present Illness: The patient is a 78 y.o. male who presents to [facility] with nausea vomiting and diarrhea. Last night around 10 he states that he started having some nausea and vomiting. After a few episodes that resolved and then he started having diarrhea all throughout the night. His wife then had him come into the hospital for further work-up. He gets most of his care at the [address] [facility] but he was brought here instead. Patient denies any rectal bleeding or bloody vomitus. Patient did have some right lower quadrant pain which she states comes and goes over the past few years. In the emergency department his temperature was normal his respirations were elevated his heart rate was normal his blood pressure was soft in the 80s and required a 2 L IV fluid bolus. Patient's labs did show an elevated creatinine of 1.41 with a lactic acid of 3.1, troponin of 0.03, white blood cell count of 16 with a left shift. 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 Patient will be admitted in the hospital for further care and evaluation. Past Medical History:        Diagnosis Date    Arthritis     CAD (coronary artery disease)     Erectile dysfunction     GERD (gastroesophageal reflux disease)     High blood pressure     Hyperlipidemia     Neuropathy     disc diseae of back with sciatica.     Obesity        Past Surgical History:        Procedure Laterality Date    APPENDECTOMY  1976    BACK SURGERY  2009    CARPAL TUNNEL RELEASE      CHOLECYSTECTOMY      CORONARY ANGIOPLASTY WITH STENT PLACEMENT      5 stents    GALLBLADDER SURGERY         Medications Prior to Admission:    Prior to Admission medications    Medication Sig Start Date End Date Taking?  Authorizing Provider   aspirin 81 MG chewable tablet Take 1 tablet by mouth daily 12/9/20   BRIAN Pettit CNP   isosorbide mononitrate (IMDUR) 60 MG extended release tablet Take 120 mg by mouth daily    Historical Provider, MD   glipiZIDE (GLUCOTROL) 5 MG tablet Take 5 mg by mouth 2 times daily (before meals)    Historical Provider, MD   apixaban (ELIQUIS) 5 MG TABS tablet Take 5 mg by mouth 2 times daily    Historical Provider, MD   EMPAGLIFLOZIN PO Take 12.5 mg by mouth daily    Historical Provider, MD   clopidogrel (PLAVIX) 75 MG tablet Take 75 mg by mouth daily    Historical Provider, MD   ferrous sulfate 325 (65 Fe) MG tablet Take 325 mg by mouth every other day    Historical Provider, MD   insulin glargine (LANTUS) 100 UNIT/ML injection vial Inject 60 Units into the skin daily     Historical Provider, MD   METFORMIN HCL ER PO Take 2,000 mg by mouth nightly     Historical Provider, MD   pantoprazole (PROTONIX) 40 MG tablet Take 40 mg by mouth daily     Historical Provider, MD   atorvastatin (LIPITOR) 80 MG tablet Take 80 mg by mouth nightly     Historical Provider, MD   lisinopril (PRINIVIL;ZESTRIL) 40 MG tablet Take 40 mg by mouth daily    Historical Provider, MD   metoprolol succinate (TOPROL XL) 200 MG extended release tablet Take 200 mg by mouth daily    Historical Provider, MD   polyvinyl alcohol (LIQUIFILM TEARS) 1.4 % ophthalmic solution Place 1 drop into both eyes 3 times daily    Historical Provider, MD   cloNIDine (CATAPRES) 0.1 MG tablet Take 0.1 mg by mouth 3 times daily     Historical Provider, MD   eplerenone (INSPRA) 50 MG tablet Take 50 mg by mouth 2 times daily    Historical Provider, MD   fluticasone (FLONASE) 50 MCG/ACT nasal spray 1 spray by Nasal route daily    Historical Provider, MD   gabapentin (NEURONTIN) 600 MG tablet Take 600 mg by mouth 3 times daily. Historical Provider, MD   Magnesium Oxide 420 MG TABS Take 2 tablets by mouth 2 times daily     Historical Provider, MD   ranolazine (RANEXA) 1000 MG extended release tablet Take 1,000 mg by mouth 2 times daily     Historical Provider, MD   tamsulosin (FLOMAX) 0.4 MG capsule Take 0.4 mg by mouth nightly    Historical Provider, MD   Cholecalciferol 2000 units TABS Take by mouth daily    Historical Provider, MD   hydrALAZINE (APRESOLINE) 50 MG tablet Take 50 mg by mouth 2 times daily     Historical Provider, MD       Allergies:  Doxazosin and Norvasc [amlodipine besylate]    Social History:  The patient currently lives at home with wife    TOBACCO:   reports that he quit smoking about 51 years ago. He has a 0.50 pack-year smoking history. He has never used smokeless tobacco.  ETOH:   reports no history of alcohol use. Family History:  Reviewed in detail and negative for DM, Early CAD, Cancer, CVA. Positive as follows:        Problem Relation Age of Onset    Kidney Disease Mother     Diabetes Mother     Heart Failure Father        REVIEW OF SYSTEMS:   Positive for as noted in the HPI. All other systems reviewed and negative. PHYSICAL EXAM:    BP (!) 108/50   Pulse 66   Temp 99.1 °F (37.3 °C) (Oral)   Resp 21   Ht 5' 8.5\" (1.74 m)   Wt 277 lb 12.5 oz (126 kg)   SpO2 97%   BMI 41.62 kg/m²     General appearance: No apparent distress appears stated age and cooperative, obese  HEENT Normal cephalic, atraumatic without obvious deformity. Pupils equal, round, and reactive to light. Extra ocular muscles intact. Conjunctivae/corneas clear. Neck: Supple, No jugular venous distention/bruits. Trachea midline without thyromegaly or adenopathy with full range of motion.   Lungs: Clear to auscultation, bilaterally   Heart: Regular rate and rhythm with Normal S1/S2   Abdomen: Soft, non-tender or non-distended without rigidity or guarding and positive bowel sounds all four quadrants. Extremities: No clubbing, cyanosis, or edema bilaterally  Skin: Skin color, texture, turgor normal.  No rashes or lesions. Neurologic: Alert and oriented X 3, neurovascularly intact with sensory/motor intact upper extremities/lower extremities, bilaterally. Cranial nerves: II-XII intact, grossly non-focal.  Mental status: Alert, oriented, thought content appropriate. Capillary Refill: Acceptable  < 3 seconds  Peripheral Pulses: +3 Easily felt, not easily obliterated with pressure      CT:  I have reviewed the CT with the following interpretation: no acute process    EKG:  I have reviewed the EKG with the following interpretation: NSR    CBC   Recent Labs     07/07/21  1248   WBC 16.2*   HGB 15.1   HCT 44.6         RENAL  Recent Labs     07/07/21  1248   *   K 4.8   CL 98*   CO2 23   PHOS 2.2*   BUN 29*   CREATININE 1.4*     LFT'S  Recent Labs     07/07/21  1248   AST 13*   ALT 12   BILITOT 0.5   ALKPHOS 100     COAG  No results for input(s): INR in the last 72 hours.   CARDIAC ENZYMES  Recent Labs     07/07/21  1248   TROPONINI 0.03*       U/A:    Lab Results   Component Value Date    NITRITE neg 09/21/2011    COLORU YELLOW 07/07/2021    CLARITYU Clear 07/07/2021    SPECGRAV >1.030 07/07/2021    LEUKOCYTESUR Negative 07/07/2021    BLOODU Negative 07/07/2021    GLUCOSEU >=1000 07/07/2021       ABG  No results found for: SNL1SMQ, BEART, C7HSVSYG, PHART, THGBART, YRP7TOM, PO2ART, ZKL9TVQ      PHYSICIANS CERTIFICATION:    I certify that Leah Sampson is expected to be hospitalized for more than 2 midnights based on the following assessment and plan:      ASSESSMENT/PLAN:    Nausea and vomiting  Possible viral gastroenteritis  Continue IV fluids  Continue supportive care    Diarrhea  Possible viral gastroenteritis, infectious process  Started on Unasyn  Bacterial pathogens ordered    Sepsis  Respirations, blood pressure, lactic acid, white blood cell count, source GI tract  Await blood cultures, GI pathogens  Started on Unasyn  2 L IV fluid bolus in emergency department followed by maintenance fluids  Trend lactic acid    CAD status post CABG  Continue home medications    Chronic systolic and diastolic CHF  Not in acute exacerbation  Continue to monitor    Hypertension  Hold all home medications  Continue IV fluids    DVT Prophylaxis: SCD  Diet: No diet orders on file  Code Status: Prior  PT/OT Eval Status: JOSE Alvarado MD    Thank you No primary care provider on file. for the opportunity to be involved in this patient's care. If you have any questions or concerns please feel free to contact me at [phone].

## 2021-07-07 NOTE — PROGRESS NOTES
Patient arrives to room 4123 via stretcher. Patient is a stand by to contact guard assist to bed. Alert and oriented x 4. Oriented to room and call light. VS stable. Respirations easy and even with oxygen saturation 98% on 3 liters NC. Denies needs/wants. Call light in reach. Bed alarm active. Will monitor.

## 2021-07-07 NOTE — PROGRESS NOTES
Medication Reconciliation    List of medications for Sanna Johnson is currently taking is complete. Source of Information:   Epic records  Conversation with patient at bedside     Allergies  Allergy list not thoroughly reviewed with patient at this time  Allergies listed in Epic as follows: Doxazosin and Norvasc [amlodipine besylate]     Current Medications:  No current facility-administered medications for this encounter. Current Outpatient Medications:     Empagliflozin-metFORMIN HCl ER 12.5-1000 MG TB24, Take 1 tablet by mouth daily, Disp: , Rfl:     methocarbamol (ROBAXIN) 750 MG tablet, Take 750 mg by mouth 3 times daily as needed (muscle spasms), Disp: , Rfl:     isosorbide mononitrate (IMDUR) 60 MG extended release tablet, Take 120 mg by mouth daily, Disp: , Rfl:     apixaban (ELIQUIS) 5 MG TABS tablet, Take 5 mg by mouth 2 times daily, Disp: , Rfl:     clopidogrel (PLAVIX) 75 MG tablet, Take 75 mg by mouth daily, Disp: , Rfl:     ferrous sulfate 325 (65 Fe) MG tablet, Take 325 mg by mouth daily (with breakfast) , Disp: , Rfl:     insulin glargine (LANTUS) 100 UNIT/ML injection vial, Inject 60 Units into the skin daily , Disp: , Rfl:     pantoprazole (PROTONIX) 40 MG tablet, Take 40 mg by mouth daily , Disp: , Rfl:     atorvastatin (LIPITOR) 80 MG tablet, Take 80 mg by mouth nightly , Disp: , Rfl:     lisinopril (PRINIVIL;ZESTRIL) 20 MG tablet, Take 20 mg by mouth daily , Disp: , Rfl:     metoprolol succinate (TOPROL XL) 200 MG extended release tablet, Take 300 mg by mouth daily , Disp: , Rfl:     polyvinyl alcohol (LIQUIFILM TEARS) 1.4 % ophthalmic solution, Place 1 drop into both eyes as needed for Dry Eyes , Disp: , Rfl:     cloNIDine (CATAPRES) 0.1 MG tablet, Take 0.1 mg by mouth 2 times daily , Disp: , Rfl:     eplerenone (INSPRA) 50 MG tablet, Take 50 mg by mouth 2 times daily, Disp: , Rfl:     gabapentin (NEURONTIN) 600 MG tablet, Take 600 mg by mouth 3 times daily.  , Disp: , Rfl:     Magnesium Oxide 420 MG TABS, Take 2 tablets by mouth 2 times daily , Disp: , Rfl:     ranolazine (RANEXA) 1000 MG extended release tablet, Take 1,000 mg by mouth 2 times daily , Disp: , Rfl:     tamsulosin (FLOMAX) 0.4 MG capsule, Take 0.4 mg by mouth nightly, Disp: , Rfl:     hydrALAZINE (APRESOLINE) 50 MG tablet, Take 50 mg by mouth 2 times daily , Disp: , Rfl:     glipiZIDE (GLUCOTROL) 5 MG tablet, Take 5 mg by mouth 2 times daily (before meals), Disp: , Rfl:     Notes Regarding Home Medications:   Patient received all of their AM home medications prior to arrival to the emergency department  Takes combination metformin-empagliflozin   Will send request to the [facility] to confirm all active medications      Meg Diaz [facility], PharmD   7/7/2021 5:49 PM

## 2021-07-07 NOTE — ED NOTES
Handoff report given by Radha Morfin RN. I will assume care at this time.       Angélica Sweet RN  07/07/21 5136

## 2021-07-08 VITALS
HEIGHT: 68 IN | DIASTOLIC BLOOD PRESSURE: 73 MMHG | TEMPERATURE: 98 F | RESPIRATION RATE: 18 BRPM | BODY MASS INDEX: 42.69 KG/M2 | HEART RATE: 71 BPM | SYSTOLIC BLOOD PRESSURE: 124 MMHG | OXYGEN SATURATION: 93 % | WEIGHT: 281.69 LBS

## 2021-07-08 LAB
A/G RATIO: 1.2 (ref 1.1–2.2)
ALBUMIN SERPL-MCNC: 3.2 G/DL (ref 3.4–5)
ALP BLD-CCNC: 71 U/L (ref 40–129)
ALT SERPL-CCNC: 12 U/L (ref 10–40)
ANION GAP SERPL CALCULATED.3IONS-SCNC: 11 MMOL/L (ref 3–16)
ANION GAP SERPL CALCULATED.3IONS-SCNC: 8 MMOL/L (ref 3–16)
AST SERPL-CCNC: 14 U/L (ref 15–37)
BASOPHILS ABSOLUTE: 0 K/UL (ref 0–0.2)
BASOPHILS RELATIVE PERCENT: 0.3 %
BILIRUB SERPL-MCNC: 0.6 MG/DL (ref 0–1)
BUN BLDV-MCNC: 32 MG/DL (ref 7–20)
BUN BLDV-MCNC: 36 MG/DL (ref 7–20)
CALCIUM SERPL-MCNC: 8 MG/DL (ref 8.3–10.6)
CALCIUM SERPL-MCNC: 8.1 MG/DL (ref 8.3–10.6)
CHLORIDE BLD-SCNC: 105 MMOL/L (ref 99–110)
CHLORIDE BLD-SCNC: 106 MMOL/L (ref 99–110)
CO2: 23 MMOL/L (ref 21–32)
CO2: 25 MMOL/L (ref 21–32)
CREAT SERPL-MCNC: 1.5 MG/DL (ref 0.8–1.3)
CREAT SERPL-MCNC: 1.8 MG/DL (ref 0.8–1.3)
EKG ATRIAL RATE: 93 BPM
EKG DIAGNOSIS: NORMAL
EKG P AXIS: 46 DEGREES
EKG P-R INTERVAL: 184 MS
EKG Q-T INTERVAL: 432 MS
EKG QRS DURATION: 182 MS
EKG QTC CALCULATION (BAZETT): 537 MS
EKG R AXIS: 53 DEGREES
EKG T AXIS: 217 DEGREES
EKG VENTRICULAR RATE: 93 BPM
EOSINOPHILS ABSOLUTE: 0 K/UL (ref 0–0.6)
EOSINOPHILS RELATIVE PERCENT: 0.2 %
GFR AFRICAN AMERICAN: 45
GFR AFRICAN AMERICAN: 55
GFR NON-AFRICAN AMERICAN: 37
GFR NON-AFRICAN AMERICAN: 46
GI BACTERIAL PATHOGENS BY PCR: NORMAL
GLOBULIN: 2.6 G/DL
GLUCOSE BLD-MCNC: 117 MG/DL (ref 70–99)
GLUCOSE BLD-MCNC: 120 MG/DL (ref 70–99)
GLUCOSE BLD-MCNC: 123 MG/DL (ref 70–99)
GLUCOSE BLD-MCNC: 125 MG/DL (ref 70–99)
GLUCOSE BLD-MCNC: 144 MG/DL (ref 70–99)
HCT VFR BLD CALC: 38.7 % (ref 40.5–52.5)
HEMOGLOBIN: 12.7 G/DL (ref 13.5–17.5)
LACTIC ACID: 1 MMOL/L (ref 0.4–2)
LACTIC ACID: 1.3 MMOL/L (ref 0.4–2)
LYMPHOCYTES ABSOLUTE: 0.6 K/UL (ref 1–5.1)
LYMPHOCYTES RELATIVE PERCENT: 8.5 %
MCH RBC QN AUTO: 31.9 PG (ref 26–34)
MCHC RBC AUTO-ENTMCNC: 32.9 G/DL (ref 31–36)
MCV RBC AUTO: 97.1 FL (ref 80–100)
MONOCYTES ABSOLUTE: 0.7 K/UL (ref 0–1.3)
MONOCYTES RELATIVE PERCENT: 9.3 %
NEUTROPHILS ABSOLUTE: 6.1 K/UL (ref 1.7–7.7)
NEUTROPHILS RELATIVE PERCENT: 81.7 %
PDW BLD-RTO: 15.4 % (ref 12.4–15.4)
PERFORMED ON: ABNORMAL
PLATELET # BLD: 171 K/UL (ref 135–450)
PMV BLD AUTO: 6.6 FL (ref 5–10.5)
POTASSIUM REFLEX MAGNESIUM: 5 MMOL/L (ref 3.5–5.1)
POTASSIUM SERPL-SCNC: 4.7 MMOL/L (ref 3.5–5.1)
RBC # BLD: 3.99 M/UL (ref 4.2–5.9)
SODIUM BLD-SCNC: 138 MMOL/L (ref 136–145)
SODIUM BLD-SCNC: 140 MMOL/L (ref 136–145)
TOTAL PROTEIN: 5.8 G/DL (ref 6.4–8.2)
WBC # BLD: 7.5 K/UL (ref 4–11)

## 2021-07-08 PROCEDURE — 83605 ASSAY OF LACTIC ACID: CPT

## 2021-07-08 PROCEDURE — APPNB30 APP NON BILLABLE TIME 0-30 MINS: Performed by: NURSE PRACTITIONER

## 2021-07-08 PROCEDURE — 2580000003 HC RX 258: Performed by: INTERNAL MEDICINE

## 2021-07-08 PROCEDURE — 6370000000 HC RX 637 (ALT 250 FOR IP): Performed by: FAMILY MEDICINE

## 2021-07-08 PROCEDURE — APPSS30 APP SPLIT SHARED TIME 16-30 MINUTES: Performed by: NURSE PRACTITIONER

## 2021-07-08 PROCEDURE — 93010 ELECTROCARDIOGRAM REPORT: CPT | Performed by: INTERNAL MEDICINE

## 2021-07-08 PROCEDURE — 94761 N-INVAS EAR/PLS OXIMETRY MLT: CPT

## 2021-07-08 PROCEDURE — 80053 COMPREHEN METABOLIC PANEL: CPT

## 2021-07-08 PROCEDURE — 36415 COLL VENOUS BLD VENIPUNCTURE: CPT

## 2021-07-08 PROCEDURE — 6360000002 HC RX W HCPCS: Performed by: INTERNAL MEDICINE

## 2021-07-08 PROCEDURE — 2700000000 HC OXYGEN THERAPY PER DAY

## 2021-07-08 PROCEDURE — 85025 COMPLETE CBC W/AUTO DIFF WBC: CPT

## 2021-07-08 PROCEDURE — 6370000000 HC RX 637 (ALT 250 FOR IP): Performed by: INTERNAL MEDICINE

## 2021-07-08 RX ORDER — METOPROLOL SUCCINATE 200 MG/1
200 TABLET, EXTENDED RELEASE ORAL DAILY
Qty: 30 TABLET | Refills: 3 | Status: SHIPPED | OUTPATIENT
Start: 2021-07-08

## 2021-07-08 RX ADMIN — CLONIDINE HYDROCHLORIDE 0.1 MG: 0.1 TABLET ORAL at 14:14

## 2021-07-08 RX ADMIN — CLOPIDOGREL BISULFATE 75 MG: 75 TABLET ORAL at 09:25

## 2021-07-08 RX ADMIN — EPLERENONE 50 MG: 25 TABLET, FILM COATED ORAL at 09:28

## 2021-07-08 RX ADMIN — SODIUM CHLORIDE 1500 MG: 900 INJECTION INTRAVENOUS at 01:30

## 2021-07-08 RX ADMIN — GABAPENTIN 600 MG: 300 CAPSULE ORAL at 14:14

## 2021-07-08 RX ADMIN — SODIUM CHLORIDE 1500 MG: 900 INJECTION INTRAVENOUS at 13:00

## 2021-07-08 RX ADMIN — SODIUM CHLORIDE: 9 INJECTION, SOLUTION INTRAVENOUS at 04:58

## 2021-07-08 RX ADMIN — ASPIRIN 81 MG: 81 TABLET, CHEWABLE ORAL at 09:25

## 2021-07-08 RX ADMIN — APIXABAN 5 MG: 5 TABLET, FILM COATED ORAL at 09:25

## 2021-07-08 RX ADMIN — RANOLAZINE 1000 MG: 500 TABLET, FILM COATED, EXTENDED RELEASE ORAL at 09:25

## 2021-07-08 RX ADMIN — GABAPENTIN 600 MG: 300 CAPSULE ORAL at 09:25

## 2021-07-08 RX ADMIN — CLONIDINE HYDROCHLORIDE 0.1 MG: 0.1 TABLET ORAL at 09:25

## 2021-07-08 RX ADMIN — SODIUM CHLORIDE 1500 MG: 900 INJECTION INTRAVENOUS at 06:47

## 2021-07-08 RX ADMIN — INSULIN GLARGINE 60 UNITS: 100 INJECTION, SOLUTION SUBCUTANEOUS at 09:26

## 2021-07-08 RX ADMIN — PANTOPRAZOLE SODIUM 40 MG: 40 TABLET, DELAYED RELEASE ORAL at 09:25

## 2021-07-08 ASSESSMENT — ENCOUNTER SYMPTOMS
ABDOMINAL PAIN: 1
NAUSEA: 1
BLOOD IN STOOL: 0
VOMITING: 1
SHORTNESS OF BREATH: 0
DIARRHEA: 1
CHEST TIGHTNESS: 0

## 2021-07-08 ASSESSMENT — PAIN SCALES - GENERAL: PAINLEVEL_OUTOF10: 0

## 2021-07-08 NOTE — CARE COORDINATION
INITIAL CASE MANAGEMENT ASSESSMENT    Reviewed chart, met with patient to assess possible discharge needs. Explained Case Management role/services. Living Situation: Patient lives with his wife in a house with 3 steps to enter. ADLs: Independent     DME: None    PT/OT Recs: N/A     Active Services: None     Transportation: Active /Wife will transport     [facility]    [facility]      HD/PD: N/A    PLAN/COMMENTS: Patient will return to home with his wife. Electronically signed by Ron Traylor RN on 7/8/2021 at 1:12 PM    MSW/CM provided contact information for patient or family to call with any questions. SW/CM will follow and assist as needed.

## 2021-07-08 NOTE — PLAN OF CARE
Problem: Pain:  Goal: Pain level will decrease  Description: Pain level will decrease  7/8/2021 1118 by Ben White RN  Outcome: Ongoing  7/8/2021 0509 by Rufino Barbosa RN  Outcome: Ongoing  Goal: Control of acute pain  Description: Control of acute pain  Outcome: Ongoing  Goal: Control of chronic pain  Description: Control of chronic pain  7/8/2021 1118 by Ben White RN  Outcome: Ongoing  7/8/2021 0509 by Rufino Barbosa RN  Outcome: Ongoing     Problem: Skin Integrity:  Goal: Will show no infection signs and symptoms  Description: Will show no infection signs and symptoms  7/8/2021 1118 by Ben White RN  Outcome: Ongoing  7/8/2021 0509 by Rufino Barbosa RN  Outcome: Ongoing  Goal: Absence of new skin breakdown  Description: Absence of new skin breakdown  7/8/2021 1118 by Ben White RN  Outcome: Ongoing  7/8/2021 0509 by Rufino Barbosa RN  Outcome: Ongoing     Problem: Falls - Risk of:  Goal: Will remain free from falls  Description: Will remain free from falls  7/8/2021 1118 by Ben White RN  Outcome: Ongoing  7/8/2021 0509 by Rufino Barbosa RN  Outcome: Ongoing  Goal: Absence of physical injury  Description: Absence of physical injury  Outcome: Ongoing

## 2021-07-08 NOTE — PLAN OF CARE
Problem: Pain:  Goal: Pain level will decrease  Description: Pain level will decrease  Outcome: Ongoing     Problem: Pain:  Goal: Control of chronic pain  Description: Control of chronic pain  Outcome: Ongoing     Problem: Skin Integrity:  Goal: Will show no infection signs and symptoms  Description: Will show no infection signs and symptoms  Outcome: Ongoing     Problem: Skin Integrity:  Goal: Absence of new skin breakdown  Description: Absence of new skin breakdown  Outcome: Ongoing     Problem: Falls - Risk of:  Goal: Will remain free from falls  Description: Will remain free from falls  Outcome: Ongoing

## 2021-07-08 NOTE — PROGRESS NOTES
4 Eyes Skin Assessment     NAME:  Arjun Lopez OF BIRTH:  1945  MEDICAL RECORD NUMBER:  [id number]    The patient is being assess for  Admission    I agree that 2 RN's have performed a thorough Head to Toe Skin Assessment on the patient. ALL assessment sites listed below have been assessed. Areas assessed by both nurses:    Head, Face, Ears, Shoulders, Back, Chest, Arms, Elbows, Hands, Sacrum. Buttock, Coccyx, Ischium and Legs. Feet and Heels        Does the Patient have a Wound?  No noted wound(s)       Lan Prevention initiated:  No   Wound Care Orders initiated:  No    Pressure Injury (Stage 3,4, Unstageable, DTI, NWPT, and Complex wounds) if present place consult order under [de-identified] No    New and Established Ostomies if present place consult order under : No      Nurse 1 eSignature: Electronically signed by Jo Ann Flower RN on 7/8/21 at 7:59 AM EDT    **SHARE this note so that the co-signing nurse is able to place an eSignature**    Nurse 2 eSignature: {Esignature:[id number]}

## 2021-07-08 NOTE — PLAN OF CARE
Problem: Pain:  Goal: Pain level will decrease  Description: Pain level will decrease  7/8/2021 1624 by Allan Dias RN  Outcome: Completed  7/8/2021 1118 by Allan Dias RN  Outcome: Ongoing  7/8/2021 0509 by Mike Baron RN  Outcome: Ongoing  Goal: Control of acute pain  Description: Control of acute pain  7/8/2021 1624 by Allan Dias RN  Outcome: Completed  7/8/2021 1118 by Allan Dias RN  Outcome: Ongoing  Goal: Control of chronic pain  Description: Control of chronic pain  7/8/2021 1624 by Allan Dias RN  Outcome: Completed  7/8/2021 1118 by Allan Dias RN  Outcome: Ongoing  7/8/2021 0509 by Mike Baron RN  Outcome: Ongoing     Problem: Skin Integrity:  Goal: Will show no infection signs and symptoms  Description: Will show no infection signs and symptoms  7/8/2021 1624 by Allan Dias RN  Outcome: Completed  7/8/2021 1118 by Allan Dias RN  Outcome: Ongoing  7/8/2021 0509 by Mike Baron RN  Outcome: Ongoing  Goal: Absence of new skin breakdown  Description: Absence of new skin breakdown  7/8/2021 1624 by Allan Dias RN  Outcome: Completed  7/8/2021 1118 by Allan Dias RN  Outcome: Ongoing  7/8/2021 0509 by Mike Baron RN  Outcome: Ongoing     Problem: Falls - Risk of:  Goal: Will remain free from falls  Description: Will remain free from falls  7/8/2021 1624 by Allan Dias RN  Outcome: Completed  7/8/2021 1118 by Allan Dias RN  Outcome: Ongoing  7/8/2021 0509 by Mike Baron RN  Outcome: Ongoing  Goal: Absence of physical injury  Description: Absence of physical injury  7/8/2021 1624 by Allan Dias RN  Outcome: Completed  7/8/2021 1118 by Allan Dias RN  Outcome: Ongoing

## 2021-07-08 NOTE — CONSULTS
appetite change, chills, diaphoresis and fever. Resolved since admission   Respiratory: Negative for chest tightness and shortness of breath. Cardiovascular: Negative for chest pain. Gastrointestinal: Positive for abdominal pain, diarrhea, nausea and vomiting. Negative for blood in stool. NVD resolved since admission, RLQ tenderness present and ongoing for about 4 months - sees GI at the [place]   Skin: Negative for wound. Neurological: Positive for dizziness and weakness. Resolved since admission   Psychiatric/Behavioral: Negative for confusion. Past Medical History:   Diagnosis Date    Arthritis     CAD (coronary artery disease)     Erectile dysfunction     GERD (gastroesophageal reflux disease)     High blood pressure     Hyperlipidemia     Neuropathy     disc diseae of back with sciatica.     Obesity        Past Surgical History:   Procedure Laterality Date    APPENDECTOMY      BACK SURGERY  2009    CARPAL TUNNEL RELEASE      CHOLECYSTECTOMY      CORONARY ANGIOPLASTY WITH STENT PLACEMENT      5 stents    GALLBLADDER SURGERY         Allergies   Allergen Reactions    Doxazosin Shortness Of Breath    Norvasc [Amlodipine Besylate]        Social History     Socioeconomic History    Marital status:      Spouse name: Not on file    Number of children: Not on file    Years of education: Not on file    Highest education level: Not on file   Occupational History    Not on file   Tobacco Use    Smoking status: Former Smoker     Packs/day: 0.50     Years: 1.00     Pack years: 0.50     Quit date: 5/3/1970     Years since quittin.2    Smokeless tobacco: Never Used   Substance and Sexual Activity    Alcohol use: No    Drug use: No    Sexual activity: Not on file   Other Topics Concern    Not on file   Social History Narrative    Not on file     Social Determinants of Health     Financial Resource Strain:     Difficulty of Paying Living Expenses:    Food Insecurity:     Worried About Running Out of Food in the Last Year:     [address] in the Last Year:    Transportation Needs:     Lack of Transportation (Medical):      Lack of Transportation (Non-Medical):    Physical Activity:     Days of Exercise per Week:     Minutes of Exercise per Session:    Stress:     Feeling of Stress :    Social Connections:     Frequency of Communication with Friends and Family:     Frequency of Social Gatherings with Friends and Family:     Attends Mosque Services:     Active Member of Clubs or Organizations:     Attends Club or Organization Meetings:     Marital Status:    Intimate Partner Violence:     Fear of Current or Ex-Partner:     Emotionally Abused:     Physically Abused:     Sexually Abused:        Family History   Problem Relation Age of Onset    Kidney Disease Mother     Diabetes Mother     Heart Failure Father      - No history of bleeding or clotting disorders    Vital Signs  Vitals:    07/08/21 0211 07/08/21 0356 07/08/21 0728 07/08/21 0925   BP:  111/63  119/77   Pulse:  66     Resp:  18     Temp:  97.8 °F (36.6 °C)     TempSrc:  Oral     SpO2:  95% 95%    Weight: 281 lb 11 oz (127.8 kg)      Height:           Physical Exam:  General: no apparent distress, alert and oriented x3, afebrile  Psychiatric: affect appropriate  Head/Eyes/Ears/Nose/Throat: normocephalic, atraumatic, PERRLA, face symmetric  Neck: no JVD, supple, symmetrical, trachea midline  Chest/Lungs: clear to auscultation bilaterally, no accessory muscle use  Cardiac: regular rate and rhythm  Abdomen: soft, active bowel sounds, tenderness to RLQ which has been ongoing for about 4 months - has seen GI at [place] in the past for RLQ  Extremities: warm and well perfused, no signs of cyanosis or ischemia, no significant edema, bilateral upper and lower extremity motorsensory intact  Vascular exam:  -R Radial: Palp  -L Radial: Palp    Labs  Lab Results   Component Value Date    WBC 7.5 07/08/2021    HGB 12.7 07/08/2021    HCT 38.7 07/08/2021    MCV 97.1 07/08/2021     07/08/2021     Lab Results   Component Value Date     07/08/2021    K 5.0 07/08/2021     07/08/2021    CO2 23 07/08/2021    PHOS 2.2 07/07/2021    BUN 36 07/08/2021    CREATININE 1.8 07/08/2021      No components found for: GLU    Scheduled Meds:    atorvastatin  80 mg Oral Nightly    cloNIDine  0.1 mg Oral TID    eplerenone  50 mg Oral BID    fluticasone  1 spray Nasal Daily    gabapentin  600 mg Oral TID    insulin glargine  60 Units Subcutaneous Daily    pantoprazole  40 mg Oral Daily    ranolazine  1,000 mg Oral BID    tamsulosin  0.4 mg Oral Nightly    sodium chloride flush  5-40 mL Intravenous 2 times per day    apixaban  5 mg Oral BID    aspirin  81 mg Oral Daily    clopidogrel  75 mg Oral Daily    ampicillin-sulbactam  1,500 mg Intravenous Q6H     Continuous Infusions:    sodium chloride      sodium chloride 100 mL/hr at 07/08/21 1009    dextrose         Imaging:   CT Abdomin Pelvis - 7/7/2021  No evidence of acute process in the abdomen/pelvis with numerous incidental  findings as discussed.     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  If referable  symptomatology further evaluation with CT angiogram may be considered. Assessment:  -CT reading shows >90% stenosis of Celiac Artery  -RLQ abdominal tenderness - has been ongoing and has been seen by GI at [address] [place] [address] in the past for this issue   -NVD has resolved  -Hypotension has resolved with fluids  -Tolerating clear liquid diet  -No pain, nausea, vomiting or diarrhea associated with eating (aside from acute events over the last 48 hours)     Plan:   -Rule out other GI causes for Nausea, Vomiting and Diarrhea  -No acute Vascular Surgical intervention at this time  -[facility] - [facility] with [facility]     All pertinent information and plan of care discussed with Dr. Mora Augustin.     All questions and concerns were addressed with the patient. I have discussed the above stated plan with patient and spouse and the nurse. The patient and spouse verbalized understanding and agreed with the plan.     Thank you for allowing to us to participate in the care of Ivana Gandhi      Electronically signed by BRIAN Hutson CNP on 7/8/2021 at 11:21 AM

## 2021-07-08 NOTE — DISCHARGE SUMMARY
Hospital Medicine Discharge Summary    Patient: Filbert Fleischer     Gender: male  : 1945   Age: 78 y.o. MRN: [id number]    Code Status: Full Code     Primary Care Provider: No primary care provider on file. [facility]     Admit Date: 2021   Discharge Date:   2021    Admitting Physician: Darline Armstrong MD  Discharge Physician: Kirsten Ng DO     Discharge Diagnoses: Active Hospital Problems    Diagnosis Date Noted    Non-intractable vomiting with nausea [R11.2] 2021    Nausea and vomiting [R11.2] 2021       Hospital Course: a 69 y. o. male presented with nausea vomiting and diarrhea. Patient denies any rectal bleeding or bloody vomitus.  Patient did have some right lower quadrant pain which she states comes and goes over the past few years.  In the emergency department his temperature was normal his respirations were elevated his heart rate was normal his blood pressure was soft in the 80s and required a 2 L IV fluid bolus.  Patient's labs did show an elevated creatinine of 1.41 with a lactic acid of 3.1, troponin of 0.03, white blood cell count of 16 with a left shift.  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    Work up completed, and improved with treatment as below. was discharged today in stable condition. Nausea and vomiting, diarrhea -resolved with ivf  Possible viral gastroenteritis  Bacterial pathogens negative in stool studies  He has tolerated a carb control diet and feels fine     ckd  - crt at baseline  - ivf given     Sepsis vs sirs, POA - resolves  - with criteria: leukocytosis, lactic acidosis, tachypnea; source is abdomen vs dehydration a  - lactic acid, wbc normalized  - blood cultures ngtd  - ua not infected  - was given unasyn, however he is now feeling improved and asymptomatic.  Will stop abx    Disposition:  Home    Exam:     /73   Pulse 71   Temp 98 °F (36.7 °C) (Oral)   Resp abdomen/pelvis with numerous incidental   findings as discussed. 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 If referable   symptomatology further evaluation with CT angiogram may be considered. Discharge Medications:   Current Discharge Medication List        Current Discharge Medication List      CONTINUE these medications which have CHANGED    Details   metoprolol succinate (TOPROL XL) 200 MG extended release tablet Take 1 tablet by mouth daily  Qty: 30 tablet, Refills: 3           Current Discharge Medication List      CONTINUE these medications which have NOT CHANGED    Details   Empagliflozin-metFORMIN HCl ER 12.5-1000 MG TB24 Take 1 tablet by mouth daily      methocarbamol (ROBAXIN) 750 MG tablet Take 750 mg by mouth 3 times daily as needed (muscle spasms)      isosorbide mononitrate (IMDUR) 60 MG extended release tablet Take 120 mg by mouth daily      apixaban (ELIQUIS) 5 MG TABS tablet Take 5 mg by mouth 2 times daily      clopidogrel (PLAVIX) 75 MG tablet Take 75 mg by mouth daily      ferrous sulfate 325 (65 Fe) MG tablet Take 325 mg by mouth daily (with breakfast)       insulin glargine (LANTUS) 100 UNIT/ML injection vial Inject 60 Units into the skin daily       pantoprazole (PROTONIX) 40 MG tablet Take 40 mg by mouth daily       atorvastatin (LIPITOR) 80 MG tablet Take 80 mg by mouth nightly       lisinopril (PRINIVIL;ZESTRIL) 20 MG tablet Take 20 mg by mouth daily       polyvinyl alcohol (LIQUIFILM TEARS) 1.4 % ophthalmic solution Place 1 drop into both eyes as needed for Dry Eyes       cloNIDine (CATAPRES) 0.1 MG tablet Take 0.1 mg by mouth 2 times daily       eplerenone (INSPRA) 50 MG tablet Take 50 mg by mouth 2 times daily      gabapentin (NEURONTIN) 600 MG tablet Take 600 mg by mouth 3 times daily.        Magnesium Oxide 420 MG TABS Take 2 tablets by mouth 2 times daily       ranolazine (RANEXA) 1000 MG extended release tablet Take 1,000 mg by mouth 2 times daily tamsulosin (FLOMAX) 0.4 MG capsule Take 0.4 mg by mouth nightly      glipiZIDE (GLUCOTROL) 5 MG tablet Take 5 mg by mouth 2 times daily (before meals)           Current Discharge Medication List      STOP taking these medications       aspirin 81 MG chewable tablet Comments:   Reason for Stopping:         EMPAGLIFLOZIN PO Comments:   Reason for Stopping:         METFORMIN HCL ER PO Comments:   Reason for Stopping:         fluticasone (FLONASE) 50 MCG/ACT nasal spray Comments:   Reason for Stopping:         Cholecalciferol 2000 units TABS Comments:   Reason for Stopping:         hydrALAZINE (APRESOLINE) 50 MG tablet Comments:   Reason for Stopping: Follow-up appointments:  one week    Provider Follow-up:    pcp    Condition at Discharge:  Stable    The patient was seen and examined on day of discharge and this discharge summary is in conjunction with any daily progress note from day of discharge. Time Spent on discharge is 45 minutes  in the examination, evaluation, counseling and review of medications and discharge plan. Signed:    Jimmy Stoddard DO   7/8/2021      Thank you No primary care provider on file. for the opportunity to be involved in this patient's care. If you have any questions or concerns please feel free to contact me at [phone].

## 2021-07-08 NOTE — PROGRESS NOTES
Patient discharged to home. Discharge summary reviewed with patient. All questions answered. IVs removed and tolerated well. Wife providing transportation.  Electronically signed by Sanford Muñoz RN on 7/8/2021 at 4:55 PM

## 2021-07-08 NOTE — PROGRESS NOTES
Hospitalist Progress Note    CC: Non-intractable vomiting with nausea      Admit date: 7/7/2021  Days in hospital:  1    Subjective/interval history: ***    O2 status:    ROS:   {Ros - complete:30714} {DESC; UNCONSCIOUS,VENTILATED,UNABLE TO SPEAK FA:05947}. Objective:    /77   Pulse 66   Temp 97.8 °F (36.6 °C) (Oral)   Resp 18   Ht 5' 8\" (1.727 m)   Wt 281 lb 11 oz (127.8 kg)   SpO2 95%   BMI 42.83 kg/m²     Gen: alert, NAD  HEENT: NC/AT, moist mucous membranes, no oropharyngeal erythema or exudate  Neck: supple, trachea midline, no anterior cervical or SC LAD  Heart: Normal s1/s2, RRR, no murmurs, gallops, or rubs. Lungs: clear bilaterally, no wheezing, no rales, no rhonchi, no use of accessory muscles  Abd: bowel sounds present, soft, nontender, nondistended, no masses  Extrem: No clubbing, cyanosis, no edema  Skin: no rashes or lesions  Psych: A & O x3, affect appropriate  Neuro: grossly intact, moves all four extremities spontaneously.   Cap refill: +2 sec    Medications:  Scheduled Meds:   atorvastatin  80 mg Oral Nightly    cloNIDine  0.1 mg Oral TID    eplerenone  50 mg Oral BID    fluticasone  1 spray Nasal Daily    gabapentin  600 mg Oral TID    insulin glargine  60 Units Subcutaneous Daily    pantoprazole  40 mg Oral Daily    ranolazine  1,000 mg Oral BID    tamsulosin  0.4 mg Oral Nightly    sodium chloride flush  5-40 mL Intravenous 2 times per day    apixaban  5 mg Oral BID    aspirin  81 mg Oral Daily    clopidogrel  75 mg Oral Daily    ampicillin-sulbactam  1,500 mg Intravenous Q6H       PRN Meds:  sodium chloride flush, sodium chloride, ondansetron **OR** ondansetron, polyethylene glycol, acetaminophen **OR** acetaminophen, oxyCODONE **OR** oxyCODONE, glucose, dextrose, glucagon (rDNA), dextrose    IV:   sodium chloride      sodium chloride 125 mL/hr at 07/08/21 0458    dextrose           Intake/Output Summary (Last 24 hours) at 7/8/2021 0949  Last data filed at 7/8/2021 0458  Gross per 24 hour   Intake 1358.33 ml   Output    Net 1358.33 ml       Results:  CBC:   Recent Labs     07/07/21  1248 07/08/21  0559   WBC 16.2* 7.5   HGB 15.1 12.7*   HCT 44.6 38.7*   MCV 94.6 97.1    171     BMP:   Recent Labs     07/07/21  1248 07/08/21  0559   * 140   K 4.8 5.0   CL 98* 106   CO2 23 23   PHOS 2.2*  --    BUN 29* 36*   CREATININE 1.4* 1.8*     Mag: No results for input(s): MAG in the last 72 hours. Phos:   Lab Results   Component Value Date    PHOS 2.2 (L) 07/07/2021     No components found for: GLU    LIVER PROFILE:   Recent Labs     07/07/21  1248 07/08/21  0559   AST 13* 14*   ALT 12 12   LIPASE 15.0  --    BILITOT 0.5 0.6   ALKPHOS 100 71     PT/INR: No results for input(s): PROTIME, INR in the last 72 hours. APTT: No results for input(s): APTT in the last 72 hours. UA:  Recent Labs     07/07/21  1246   COLORU YELLOW   PHUR 5.0   CLARITYU Clear   SPECGRAV >1.030   LEUKOCYTESUR Negative   UROBILINOGEN 0.2   BILIRUBINUR SMALL*   BLOODU Negative   GLUCOSEU >=1000*       Invalid input(s): ABG  Lab Results   Component Value Date    CALCIUM 8.0 (L) 07/08/2021    PHOS 2.2 (L) 07/07/2021       Assessment:    Principal Problem:    Non-intractable vomiting with nausea  Active Problems:    Nausea and vomiting  Resolved Problems:    * No resolved hospital problems. [facility] course: a 78 y.o. male presented with nausea vomiting and diarrhea. Patient denies any rectal bleeding or bloody vomitus. Patient did have some right lower quadrant pain which she states comes and goes over the past few years. In the emergency department his temperature was normal his respirations were elevated his heart rate was normal his blood pressure was soft in the 80s and required a 2 L IV fluid bolus. Patient's labs did show an elevated creatinine of 1.41 with a lactic acid of 3.1, troponin of 0.03, white blood cell count of 16 with a left shift.   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       Plan:     Nausea and vomiting, diarrhea  Possible viral gastroenteritis  Continue IV fluids  Started on Unasyn  Bacterial pathogens ordered  Continue supportive care    XAVI on ckd  - crt baseline 1.2-1.4, now 1.8  - prerenal, due to dehydration  - IVF given  - Avoid nephrotoxins  - check: UACS, Cassi/UCrt, uric acid, TSH, U osmol  - follow renal function tests; if no improvement will get renal US     Sepsis, POA  - with criteria: leukocytosis, lactic acidosis, tachypnea; source is abdomen   - lactic acid, wbc normalized  - blood and urine cultures  - check procalcitonin  - ivf  - abx as above    Chronic conditions - continue home meds unless otherwise stated  Cad  htn  Chronic systolic and diastolic CHF    Code status:  ***  DVT prophylaxis: [] Lovenox  [] SQ Heparin  [] SCDs because of *** [] warfarin/oral direct thrombin inhibitor [] Encourage ambulation      Disposition:  [] Home [] Rehab [] Psych [] SNF  [] LTAC  [] Transfer to ICU  [] Transfer to PCU [] Other: ***      Electronically signed by Chaim Lyman DO on 7/8/2021 at 9:52 AM

## 2021-07-08 NOTE — ACP (ADVANCE CARE PLANNING)
Advance Care Planning     Advance Care Planning Activator (Inpatient)  Conversation Note      Date of ACP Conversation: 7/8/2021     Conversation Conducted with: Patient with Decision Making Capacity    ACP Activator: Vesta Raymundo RN      Health Care Decision Maker:     Current Designated Health Care Decision Maker:     Honey Parra    Wife     [phone]    Brad Luke      Daughter  [phone]    Care Preferences    Ventilation: \"If you were in your present state of health and suddenly became very ill and were unable to breathe on your own, what would your preference be about the use of a ventilator (breathing machine) if it were available to you? \"      Would the patient desire the use of ventilator (breathing machine)?: yes    \"If your health worsens and it becomes clear that your chance of recovery is unlikely, what would your preference be about the use of a ventilator (breathing machine) if it were available to you? \"     Would the patient desire the use of ventilator (breathing machine)?: No      Resuscitation  \"CPR works best to restart the heart when there is a sudden event, like a heart attack, in someone who is otherwise healthy. Unfortunately, CPR does not typically restart the heart for people who have serious health conditions or who are very sick. \"    \"In the event your heart stopped as a result of an underlying serious health condition, would you want attempts to be made to restart your heart (answer \"yes\" for attempt to resuscitate) or would you prefer a natural death (answer \"no\" for do not attempt to resuscitate)? \" yes       [] Yes   [x] No   Educated Patient / Fabi Matthews regarding differences between Advance Directives and portable DNR orders.     Length of ACP Conversation in minutes:  10  Conversation Outcomes:  [x] ACP discussion completed  [] Existing advance directive reviewed with patient; no changes to patient's previously recorded wishes  [] New Advance Directive

## 2021-07-11 LAB
BLOOD CULTURE, ROUTINE: NORMAL
CULTURE, BLOOD 2: NORMAL

## 2021-10-15 ENCOUNTER — CLINICAL DOCUMENTATION (OUTPATIENT)
Dept: OTHER | Age: 76
End: 2021-10-15

## 2021-11-28 ENCOUNTER — HOSPITAL ENCOUNTER (EMERGENCY)
Age: 76
Discharge: HOME | End: 2021-11-28
Attending: EMERGENCY MEDICINE
Payer: OTHER GOVERNMENT

## 2021-11-28 ENCOUNTER — APPOINTMENT (OUTPATIENT)
Dept: GENERAL RADIOLOGY | Age: 76
End: 2021-11-28
Payer: OTHER GOVERNMENT

## 2021-11-28 VITALS
HEART RATE: 70 BPM | RESPIRATION RATE: 19 BRPM | OXYGEN SATURATION: 99 % | TEMPERATURE: 98.3 F | SYSTOLIC BLOOD PRESSURE: 106 MMHG | DIASTOLIC BLOOD PRESSURE: 40 MMHG

## 2021-11-28 DIAGNOSIS — R07.9 CHEST PAIN, UNSPECIFIED TYPE: Primary | ICD-10-CM

## 2021-11-28 LAB
A/G RATIO: 1.4 (ref 1.1–2.2)
ALBUMIN SERPL-MCNC: 4 G/DL (ref 3.4–5)
ALP BLD-CCNC: 92 U/L (ref 40–129)
ALT SERPL-CCNC: 14 U/L (ref 10–40)
ANION GAP SERPL CALCULATED.3IONS-SCNC: 14 MMOL/L (ref 3–16)
AST SERPL-CCNC: 18 U/L (ref 15–37)
BASOPHILS ABSOLUTE: 0 K/UL (ref 0–0.2)
BASOPHILS RELATIVE PERCENT: 0.3 %
BILIRUB SERPL-MCNC: 0.3 MG/DL (ref 0–1)
BUN BLDV-MCNC: 29 MG/DL (ref 7–20)
CALCIUM SERPL-MCNC: 9.1 MG/DL (ref 8.3–10.6)
CHLORIDE BLD-SCNC: 99 MMOL/L (ref 99–110)
CO2: 23 MMOL/L (ref 21–32)
CREAT SERPL-MCNC: 1.4 MG/DL (ref 0.8–1.3)
EOSINOPHILS ABSOLUTE: 0.1 K/UL (ref 0–0.6)
EOSINOPHILS RELATIVE PERCENT: 1.4 %
GFR AFRICAN AMERICAN: 60
GFR NON-AFRICAN AMERICAN: 49
GLUCOSE BLD-MCNC: 193 MG/DL (ref 70–99)
HCT VFR BLD CALC: 42 % (ref 40.5–52.5)
HEMOGLOBIN: 13.6 G/DL (ref 13.5–17.5)
LYMPHOCYTES ABSOLUTE: 2.1 K/UL (ref 1–5.1)
LYMPHOCYTES RELATIVE PERCENT: 22 %
MCH RBC QN AUTO: 30.6 PG (ref 26–34)
MCHC RBC AUTO-ENTMCNC: 32.5 G/DL (ref 31–36)
MCV RBC AUTO: 94.3 FL (ref 80–100)
MONOCYTES ABSOLUTE: 0.6 K/UL (ref 0–1.3)
MONOCYTES RELATIVE PERCENT: 6.5 %
NEUTROPHILS ABSOLUTE: 6.5 K/UL (ref 1.7–7.7)
NEUTROPHILS RELATIVE PERCENT: 69.8 %
PDW BLD-RTO: 16.2 % (ref 12.4–15.4)
PLATELET # BLD: 283 K/UL (ref 135–450)
PMV BLD AUTO: 7 FL (ref 5–10.5)
POTASSIUM REFLEX MAGNESIUM: 5.1 MMOL/L (ref 3.5–5.1)
RBC # BLD: 4.45 M/UL (ref 4.2–5.9)
SODIUM BLD-SCNC: 136 MMOL/L (ref 136–145)
TOTAL PROTEIN: 6.9 G/DL (ref 6.4–8.2)
TROPONIN: 0.04 NG/ML
TROPONIN: 0.04 NG/ML
WBC # BLD: 9.4 K/UL (ref 4–11)

## 2021-11-28 PROCEDURE — 93005 ELECTROCARDIOGRAM TRACING: CPT | Performed by: EMERGENCY MEDICINE

## 2021-11-28 PROCEDURE — 85025 COMPLETE CBC W/AUTO DIFF WBC: CPT

## 2021-11-28 PROCEDURE — 84484 ASSAY OF TROPONIN QUANT: CPT

## 2021-11-28 PROCEDURE — 36415 COLL VENOUS BLD VENIPUNCTURE: CPT

## 2021-11-28 PROCEDURE — 99284 EMERGENCY DEPT VISIT MOD MDM: CPT

## 2021-11-28 PROCEDURE — 71045 X-RAY EXAM CHEST 1 VIEW: CPT

## 2021-11-28 PROCEDURE — 80053 COMPREHEN METABOLIC PANEL: CPT

## 2021-11-28 NOTE — ED NOTES
Bed: B-10  Expected date: 11/28/21  Expected time: 11:20 AM  Means of arrival:   Comments:  76M CP radiating to R arm, 3 nitro without relief, STEMI alert     Luis Miguel Orellana RN  11/28/21 [id number]

## 2021-11-28 NOTE — ED TRIAGE NOTES
Pt complains of chest pain that started while at [employer] with diaphoresis. Pt took 3 nitro pills with relief. Denies SOB or pain at this time.

## 2021-11-28 NOTE — ED PROVIDER NOTES
eMERGENCY dEPARTMENT eNCOUnter      Pt Name: Claudell Loan  MRN: [id number]  [place] 1945  Date of evaluation: 11/28/2021  Provider: Nel Milner MD     [address] [place] [address]       Chief Complaint   Patient presents with    Chest Pain         HISTORY OF PRESENT ILLNESS   (Location/Symptom, Timing/Onset,Context/Setting, Quality, Duration, Modifying Factors, Severity) Note limiting factors. HPI    Claudell Loan is a 78 y.o. male who presents to the emergency department via ambulance with severe chest pain. Patient has history of significant coronary artery disease bypass surgery x3 multiple stents most of his care has been done at the [place]. Presents with a severe chest pain at home today. He did take 3 nitroglycerin and was diaphoretic. On arrival by ambulance no EKG changes but still have a little bit of chest pain but by the time I saw the patient has chest pain is resolving. She is no longer diaphoretic pain is mostly gone no EKG changes. Patient is not short of breath. Nursing Notes were reviewed. REVIEW OFSYSTEMS    (2+ for level 4; 10+ for level 5)   Review of Systems    General: No fevers, chills or night sweats, No weight loss    Head:  No Sore throat,  No Ear Pain    Chest:  Nontender. No Cough, No SOB,  Chest Pain is positive    GI: No abdominal pain or vomiting    : No dysuria or hematuria    Musculoskeletal: No unrelenting pain or night pain    Neurologic: No bowel or bladder incontinence, No saddle anesthesia, No leg weakness    All other systems reviewed and are negative. PAST MEDICAL HISTORY     Past Medical History:   Diagnosis Date    Arthritis     CAD (coronary artery disease)     Erectile dysfunction     GERD (gastroesophageal reflux disease)     High blood pressure     Hyperlipidemia     Neuropathy     disc diseae of back with sciatica.     Obesity        SURGICAL HISTORY       Past Surgical History:   Procedure Laterality Date    APPENDECTOMY  1976    BACK SURGERY 2009    CARPAL TUNNEL RELEASE      CHOLECYSTECTOMY      CORONARY ANGIOPLASTY WITH STENT PLACEMENT      5 stents    GALLBLADDER SURGERY         CURRENT MEDICATIONS       Previous Medications    APIXABAN (ELIQUIS) 5 MG TABS TABLET    Take 5 mg by mouth 2 times daily    ATORVASTATIN (LIPITOR) 80 MG TABLET    Take 80 mg by mouth nightly     CLONIDINE (CATAPRES) 0.1 MG TABLET    Take 0.1 mg by mouth 2 times daily     CLOPIDOGREL (PLAVIX) 75 MG TABLET    Take 75 mg by mouth daily    EMPAGLIFLOZIN-METFORMIN HCL ER 12.5-1000 MG TB24    Take 1 tablet by mouth daily    EPLERENONE (INSPRA) 50 MG TABLET    Take 50 mg by mouth 2 times daily    FERROUS SULFATE 325 (65 FE) MG TABLET    Take 325 mg by mouth daily (with breakfast)     GABAPENTIN (NEURONTIN) 600 MG TABLET    Take 600 mg by mouth 3 times daily.      GLIPIZIDE (GLUCOTROL) 5 MG TABLET    Take 5 mg by mouth 2 times daily (before meals)    INSULIN GLARGINE (LANTUS) 100 UNIT/ML INJECTION VIAL    Inject 60 Units into the skin daily     ISOSORBIDE MONONITRATE (IMDUR) 60 MG EXTENDED RELEASE TABLET    Take 120 mg by mouth daily    LISINOPRIL (PRINIVIL;ZESTRIL) 20 MG TABLET    Take 20 mg by mouth daily     MAGNESIUM OXIDE 420 MG TABS    Take 2 tablets by mouth 2 times daily     METHOCARBAMOL (ROBAXIN) 750 MG TABLET    Take 750 mg by mouth 3 times daily as needed (muscle spasms)    METOPROLOL SUCCINATE (TOPROL XL) 200 MG EXTENDED RELEASE TABLET    Take 1 tablet by mouth daily    PANTOPRAZOLE (PROTONIX) 40 MG TABLET    Take 40 mg by mouth daily     POLYVINYL ALCOHOL (LIQUIFILM TEARS) 1.4 % OPHTHALMIC SOLUTION    Place 1 drop into both eyes as needed for Dry Eyes     RANOLAZINE (RANEXA) 1000 MG EXTENDED RELEASE TABLET    Take 1,000 mg by mouth 2 times daily     TAMSULOSIN (FLOMAX) 0.4 MG CAPSULE    Take 0.4 mg by mouth nightly       ALLERGIES     Doxazosin and Norvasc [amlodipine besylate]    FAMILY HISTORY       Family History   Problem Relation Age of Onset    Kidney Disease Mother     Diabetes Mother     Heart Failure Father         SOCIAL HISTORY       Social History     Socioeconomic History    Marital status:      Spouse name: None    Number of children: None    Years of education: None    Highest education level: None   Occupational History    None   Tobacco Use    Smoking status: Former Smoker     Packs/day: 0.50     Years: 1.00     Pack years: 0.50     Quit date: 5/3/1970     Years since quittin.6    Smokeless tobacco: Never Used   Substance and Sexual Activity    Alcohol use: No    Drug use: No    Sexual activity: None   Other Topics Concern    None   Social History Narrative    None     Social Determinants of Health     Financial Resource Strain:     Difficulty of Paying Living Expenses: Not on file   Food Insecurity:     Worried About Running Out of Food in the Last Year: Not on file    Aicha of Food in the Last Year: Not on file   Transportation Needs:     Lack of Transportation (Medical): Not on file    Lack of Transportation (Non-Medical):  Not on file   Physical Activity:     Days of Exercise per Week: Not on file    Minutes of Exercise per Session: Not on file   Stress:     Feeling of Stress : Not on file   Social Connections:     Frequency of Communication with Friends and Family: Not on file    Frequency of Social Gatherings with Friends and Family: Not on file    Attends Jehov[employer] witness Services: Not on file    Active Member of [address] [place] [address] [employer] or Organizations: Not on file    Attends Club or Organization Meetings: Not on file    Marital Status: Not on file   Intimate Partner Violence:     Fear of Current or Ex-Partner: Not on file    Emotionally Abused: Not on file    Physically Abused: Not on file    Sexually Abused: Not on file   Housing Stability:     Unable to Pay for Housing in the Last Year: Not on file    Number of [place] in the Last Year: Not on file    Unstable Housing in the Last Year: Not on file       SCREENINGS PHYSICAL EXAM    (up to 7 for level 4, 8 or more for level 5)     ED Triage Vitals   BP Temp Temp src Pulse Resp SpO2 Height Weight   -- -- -- -- -- -- -- --       Physical Exam    General: Alert and awake ×3. Nontoxic appearance. Well-developed well-nourished obese 79-year-old male with history of coronary artery disease presents with chest pain. Vital signs are stable  HEENT: Normocephalic atraumatic. Neck is supple. Airway intact. No adenopathy  Cardiac: Regular rate and rhythm with no murmurs rubs or gallops. Chest wall is nontender  Pulmonary: Lungs are clear in all lung fields. No wheezing. No Rales. Abdomen: Soft and nontender. Negative hepatosplenomegaly. Bowel sounds are active  Extremities: Moving all extremities. No calf tenderness. Peripheral pulses all intact  Skin: No skin lesions. No rashes  Neurologic: Cranial nerves II through XII was grossly intact. Nonfocal neurological exam  Psychiatric: Patient is pleasant. Mood is appropriate. DIAGNOSTIC RESULTS     EKG (Per Emergency Physician):     NSR with LBBB      RADIOLOGY (Per Emergency Physician): Interpretation per the Radiologist below, if available at the time of this note:  XR CHEST PORTABLE    Result Date: 11/28/2021  EXAMINATION: [address] CHEST 11/28/2021 11:35 am COMPARISON: 12/05/2020, 12/23/2017 HISTORY: ORDERING SYSTEM PROVIDED HISTORY: CP TECHNOLOGIST PROVIDED HISTORY: Reason for exam:->CP Reason for Exam: Mid chest pain Acuity: Acute Type of Exam: Initial FINDINGS: A single frontal view of the chest was performed. There is no acute skeletal abnormality. The patient is status post median sternotomy. There is a stable left subclavian pacemaker in position. Heart size and mediastinal contours are stable, and within normal limits. There is stable chronic elevation of the right hemidiaphragm. The pulmonary vascularity is normal. There are stable scattered calcified granulomata.   No focus of acute airspace consolidation is seen. There is no evidence of a pneumothorax. 1. No acute cardiopulmonary disease. 2. Stable chronic elevation of the right hemidiaphragm. ED BEDSIDE ULTRASOUND:   Performed by ED Physician - none    LABS:  Labs Reviewed   CBC WITH AUTO DIFFERENTIAL - Abnormal; Notable for the following components:       Result Value    RDW 16.2 (*)     All other components within normal limits    Narrative:     Performed at:  [address] [employer] [facility] [address]   Phone [phone]   COMPREHENSIVE METABOLIC PANEL W/ REFLEX TO MG FOR LOW K - Abnormal; Notable for the following components:    Glucose 193 (*)     BUN 29 (*)     CREATININE 1.4 (*)     GFR Non- 49 (*)     GFR  60 (*)     All other components within normal limits    Narrative:     Performed at:  [address] [employer] [facility] [address]   Phone [phone]   TROPONIN - Abnormal; Notable for the following components:    Troponin 0.04 (*)     All other components within normal limits    Narrative:     Performed at:  [address] [employer] [facility] [address]   Phone [phone]   TROPONIN - Abnormal; Notable for the following components:    Troponin 0.04 (*)     All other components within normal limits    Narrative:     Performed at:  [address] [employer] [facility] [address]   Phone [phone]        All other labs were within normal range or not returned as of this dictation. Procedures      EMERGENCY DEPARTMENT COURSE and DIFFERENTIAL DIAGNOSIS/MDM:   Vitals:    Vitals:    11/28/21 1215   BP: (!) 119/50   Pulse: 64   Resp: 9   Temp: 98.3 °F (36.8 °C)   TempSrc: Oral   SpO2: 96%       Medications - No data to display    MDM. Is a healthy 79-year-old obese male with chest pain.   No EKG changes left bundle branch block. First set of enzymes was 0.04 slightly elevated from his baseline of 0.03 will check another 1 in about 2 hours to see if there is any bump. Patient is basically pain-free at this time pain was relieved with his nitroglycerin x3. No longer diaphoretic. Patient's chest pain did not return. Troponin slightly elevated but his baseline is 0.03 and is 0.0 4 repeat 2 hours later still 0.04 there is no increase. There is no EKG changes and no chest pain. No indication for admission at this time patient's vital signs were stable. Patient wants to go home. Told patient that if the pain returns that he needs to come back to be admitted. Patient concurs and will follow up. REVAL:         CRITICAL CARE TIME   Total CriticalCare time was 0 minutes, excluding separately reportable procedures. There was a high probability of clinically significant/life threatening deterioration in the patient's condition which required my urgent intervention. CONSULTS:  None    PROCEDURES:  Unless otherwise noted below, none     [unfilled]    FINAL IMPRESSION      1. Chest pain, unspecified type          DISPOSITION/PLAN   DISPOSITION        PATIENT REFERRED TO:  Family physician or the [place]. Schedule an appointment as soon as possible for a visit in 1 week  If symptoms worsen      DISCHARGE MEDICATIONS:  New Prescriptions    No medications on file          (Please note:  Portions of this note were completed with a voice recognition program.Efforts were made to edit the dictations but occasionally words and phrases are mis-transcribed.)  Form v2016. J.[id number]    Maureen DOLAN MD (electronically signed)  Emergency Medicine Provider       Nighat Hyde MD  11/28/21 [id number]

## 2021-11-29 LAB
EKG ATRIAL RATE: 65 BPM
EKG DIAGNOSIS: NORMAL
EKG P AXIS: 48 DEGREES
EKG P-R INTERVAL: 212 MS
EKG Q-T INTERVAL: 470 MS
EKG QRS DURATION: 166 MS
EKG QTC CALCULATION (BAZETT): 488 MS
EKG R AXIS: 81 DEGREES
EKG T AXIS: 226 DEGREES
EKG VENTRICULAR RATE: 65 BPM

## 2021-11-29 PROCEDURE — 93010 ELECTROCARDIOGRAM REPORT: CPT | Performed by: INTERNAL MEDICINE

## 2024-01-10 ENCOUNTER — OFFICE VISIT (OUTPATIENT)
Dept: PAIN MANAGEMENT | Age: 79
End: 2024-01-10
Payer: MEDICARE

## 2024-01-10 VITALS
HEIGHT: 67 IN | WEIGHT: 284 LBS | SYSTOLIC BLOOD PRESSURE: 121 MMHG | DIASTOLIC BLOOD PRESSURE: 79 MMHG | HEART RATE: 64 BPM | OXYGEN SATURATION: 94 % | BODY MASS INDEX: 44.57 KG/M2

## 2024-01-10 DIAGNOSIS — M50.30 DDD (DEGENERATIVE DISC DISEASE), CERVICAL: ICD-10-CM

## 2024-01-10 DIAGNOSIS — M47.812 CERVICAL SPONDYLOSIS: ICD-10-CM

## 2024-01-10 DIAGNOSIS — M79.18 MYOFASCIAL PAIN: ICD-10-CM

## 2024-01-10 DIAGNOSIS — M54.12 CERVICAL RADICULITIS: ICD-10-CM

## 2024-01-10 DIAGNOSIS — G89.4 CHRONIC PAIN SYNDROME: ICD-10-CM

## 2024-01-10 PROCEDURE — 1036F TOBACCO NON-USER: CPT | Performed by: INTERNAL MEDICINE

## 2024-01-10 PROCEDURE — 3074F SYST BP LT 130 MM HG: CPT | Performed by: INTERNAL MEDICINE

## 2024-01-10 PROCEDURE — 1123F ACP DISCUSS/DSCN MKR DOCD: CPT | Performed by: INTERNAL MEDICINE

## 2024-01-10 PROCEDURE — G8419 CALC BMI OUT NRM PARAM NOF/U: HCPCS | Performed by: INTERNAL MEDICINE

## 2024-01-10 PROCEDURE — 3078F DIAST BP <80 MM HG: CPT | Performed by: INTERNAL MEDICINE

## 2024-01-10 PROCEDURE — 99204 OFFICE O/P NEW MOD 45 MIN: CPT | Performed by: INTERNAL MEDICINE

## 2024-01-10 PROCEDURE — G8427 DOCREV CUR MEDS BY ELIG CLIN: HCPCS | Performed by: INTERNAL MEDICINE

## 2024-01-10 PROCEDURE — G8484 FLU IMMUNIZE NO ADMIN: HCPCS | Performed by: INTERNAL MEDICINE

## 2024-01-10 RX ORDER — ASPIRIN 81 MG/1
81 TABLET ORAL
COMMUNITY

## 2024-01-10 RX ORDER — UREA 1 ML/10ML
LOTION TOPICAL
COMMUNITY
Start: 2023-04-07

## 2024-01-10 RX ORDER — HYDROCODONE BITARTRATE AND ACETAMINOPHEN 5; 325 MG/1; MG/1
1 TABLET ORAL EVERY 6 HOURS PRN
COMMUNITY
End: 2024-01-10

## 2024-01-10 RX ORDER — LORATADINE 10 MG/1
10 TABLET ORAL DAILY
COMMUNITY

## 2024-01-10 RX ORDER — LIDOCAINE 50 MG/G
1 PATCH TOPICAL DAILY
COMMUNITY

## 2024-01-10 RX ORDER — HYDRALAZINE HYDROCHLORIDE 50 MG/1
50 TABLET, FILM COATED ORAL
COMMUNITY
Start: 2023-12-12

## 2024-01-10 RX ORDER — CHLORHEXIDINE GLUCONATE ORAL RINSE 1.2 MG/ML
SOLUTION DENTAL
COMMUNITY
Start: 2023-12-28

## 2024-01-10 RX ORDER — DULOXETIN HYDROCHLORIDE 30 MG/1
30 CAPSULE, DELAYED RELEASE ORAL DAILY
Qty: 30 CAPSULE | Refills: 0 | Status: SHIPPED | OUTPATIENT
Start: 2024-01-10

## 2024-01-10 RX ORDER — NIFEDIPINE 60 MG/1
60 TABLET, FILM COATED, EXTENDED RELEASE ORAL
COMMUNITY
Start: 2023-06-15

## 2024-01-10 RX ORDER — NORTRIPTYLINE HYDROCHLORIDE 25 MG/1
25-50 CAPSULE ORAL NIGHTLY
Qty: 30 CAPSULE | Refills: 0 | Status: SHIPPED | OUTPATIENT
Start: 2024-01-10

## 2024-02-06 ENCOUNTER — OFFICE VISIT (OUTPATIENT)
Dept: PAIN MANAGEMENT | Age: 79
End: 2024-02-06
Payer: MEDICARE

## 2024-02-06 VITALS
SYSTOLIC BLOOD PRESSURE: 117 MMHG | BODY MASS INDEX: 44.48 KG/M2 | HEART RATE: 68 BPM | WEIGHT: 284 LBS | DIASTOLIC BLOOD PRESSURE: 69 MMHG | OXYGEN SATURATION: 95 %

## 2024-02-06 DIAGNOSIS — M79.18 MYOFASCIAL PAIN: ICD-10-CM

## 2024-02-06 DIAGNOSIS — M47.812 CERVICAL SPONDYLOSIS: ICD-10-CM

## 2024-02-06 DIAGNOSIS — M54.12 CERVICAL RADICULITIS: ICD-10-CM

## 2024-02-06 DIAGNOSIS — M50.30 DDD (DEGENERATIVE DISC DISEASE), CERVICAL: ICD-10-CM

## 2024-02-06 DIAGNOSIS — G89.4 CHRONIC PAIN SYNDROME: ICD-10-CM

## 2024-02-06 PROCEDURE — G8427 DOCREV CUR MEDS BY ELIG CLIN: HCPCS | Performed by: INTERNAL MEDICINE

## 2024-02-06 PROCEDURE — 99213 OFFICE O/P EST LOW 20 MIN: CPT | Performed by: INTERNAL MEDICINE

## 2024-02-06 PROCEDURE — G8484 FLU IMMUNIZE NO ADMIN: HCPCS | Performed by: INTERNAL MEDICINE

## 2024-02-06 PROCEDURE — 3074F SYST BP LT 130 MM HG: CPT | Performed by: INTERNAL MEDICINE

## 2024-02-06 PROCEDURE — G8417 CALC BMI ABV UP PARAM F/U: HCPCS | Performed by: INTERNAL MEDICINE

## 2024-02-06 PROCEDURE — 1123F ACP DISCUSS/DSCN MKR DOCD: CPT | Performed by: INTERNAL MEDICINE

## 2024-02-06 PROCEDURE — 3078F DIAST BP <80 MM HG: CPT | Performed by: INTERNAL MEDICINE

## 2024-02-06 PROCEDURE — 1036F TOBACCO NON-USER: CPT | Performed by: INTERNAL MEDICINE

## 2024-02-06 RX ORDER — DULOXETIN HYDROCHLORIDE 30 MG/1
30 CAPSULE, DELAYED RELEASE ORAL DAILY
Qty: 30 CAPSULE | Refills: 1 | Status: SHIPPED | OUTPATIENT
Start: 2024-02-06

## 2024-02-06 RX ORDER — NORTRIPTYLINE HYDROCHLORIDE 25 MG/1
25-50 CAPSULE ORAL NIGHTLY
Qty: 30 CAPSULE | Refills: 1 | Status: SHIPPED | OUTPATIENT
Start: 2024-02-06

## 2024-02-06 NOTE — PROGRESS NOTES
Bebeto Chan  1945  [id number]    HISTORY OF PRESENT ILLNESS:  Mr. Chan is a 78 y.o. male returns for a follow up visit for multiple medical problems.  His  presenting problems are   1. Chronic pain syndrome    2. DDD (degenerative disc disease), cervical    3. Cervical spondylosis    4. Cervical radiculitis    5. Myofascial pain    .    As per information/history obtained from the PADT(patient assessment and documentation tool) -  He complains of pain in the neck with radiation to the shoulders Bilateral He rates the pain 8/10 and describes it as sharp, aching, burning.  Pain is made worse by: movement, walking, standing, sitting, bending, lifting.  Current treatment regimen has helped relieve about 10% of the pain.  He denies side effects from the current pain regimen.   Patient reports that since last follow up visit the physical functioning is unchanged, family/social relationships are unchanged, mood is unchanged sleep patterns are unchanged.  Mr. Chan states that since starting the treatment with the current regimen the  overall functioning  in the above aspects is  better,Patient denies neurological bowel or bladder. Patient denies misusing/abusing his narcotic pain medications or using any illegal drugs.  There are No indicators for possible drug abuse, addiction or diversion problems. Upon obtaining the medical history from Mr. Chan regarding today's office visit for his presenting problems, Patient states he has been doing about the same. He complains of pain in the neck and bilateral shoulders. He mentions had a MRI cervical spine done, results pending. He says he did not start the Cymbalta along with along with Pamelor. He states the pain is aching, stabbing and burning in the neck.       ALLERGIES: Patients list of allergies were reviewed     MEDICATIONS: Mr. Chan list of medications were reviewed.His current medications are   Outpatient Medications Prior to Visit   Medication Sig Dispense Refill

## 2024-03-27 ENCOUNTER — OFFICE VISIT (OUTPATIENT)
Dept: PAIN MANAGEMENT | Age: 79
End: 2024-03-27
Payer: OTHER GOVERNMENT

## 2024-03-27 VITALS
BODY MASS INDEX: 43.7 KG/M2 | WEIGHT: 279 LBS | HEART RATE: 70 BPM | OXYGEN SATURATION: 94 % | SYSTOLIC BLOOD PRESSURE: 121 MMHG | DIASTOLIC BLOOD PRESSURE: 78 MMHG

## 2024-03-27 DIAGNOSIS — M79.18 MYOFASCIAL PAIN: ICD-10-CM

## 2024-03-27 DIAGNOSIS — M50.30 DDD (DEGENERATIVE DISC DISEASE), CERVICAL: ICD-10-CM

## 2024-03-27 DIAGNOSIS — M47.812 CERVICAL SPONDYLOSIS: ICD-10-CM

## 2024-03-27 DIAGNOSIS — M54.12 CERVICAL RADICULITIS: ICD-10-CM

## 2024-03-27 DIAGNOSIS — G89.4 CHRONIC PAIN SYNDROME: ICD-10-CM

## 2024-03-27 PROCEDURE — 1123F ACP DISCUSS/DSCN MKR DOCD: CPT | Performed by: INTERNAL MEDICINE

## 2024-03-27 PROCEDURE — 99213 OFFICE O/P EST LOW 20 MIN: CPT | Performed by: INTERNAL MEDICINE

## 2024-03-27 PROCEDURE — 3074F SYST BP LT 130 MM HG: CPT | Performed by: INTERNAL MEDICINE

## 2024-03-27 PROCEDURE — 3078F DIAST BP <80 MM HG: CPT | Performed by: INTERNAL MEDICINE

## 2024-03-27 RX ORDER — NORTRIPTYLINE HYDROCHLORIDE 25 MG/1
25-50 CAPSULE ORAL NIGHTLY
Qty: 30 CAPSULE | Refills: 1 | Status: SHIPPED | OUTPATIENT
Start: 2024-03-27

## 2024-03-27 RX ORDER — DULOXETIN HYDROCHLORIDE 30 MG/1
30 CAPSULE, DELAYED RELEASE ORAL DAILY
Qty: 30 CAPSULE | Refills: 1 | Status: SHIPPED | OUTPATIENT
Start: 2024-03-27

## 2024-03-27 NOTE — PROGRESS NOTES
Bebeto Chan  1945  [id number]    HISTORY OF PRESENT ILLNESS:  Mr. Chan is a 78 y.o. male returns for a follow up visit for multiple medical problems.  His  presenting problems are   1. Chronic pain syndrome    2. DDD (degenerative disc disease), cervical    3. Cervical spondylosis    4. Cervical radiculitis    5. Myofascial pain    .    As per information/history obtained from the PADT(patient assessment and documentation tool) -  He complains of pain in the neck with radiation to the shoulders Bilateral He rates the pain 8/10 and describes it as burning.  Pain is made worse by: movement, walking, standing, sitting, bending, lifting.  Current treatment regimen has helped relieve about 10% of the pain.  He denies side effects from the current pain regimen.   Patient reports that since last follow up visit the physical functioning is unchanged, family/social relationships are unchanged, mood is unchanged sleep patterns are worse.  Mr. Chan states that since starting the treatment with the current regimen the  overall functioning  in the above aspects is  better,Patient denies neurological bowel or bladder. Patient denies misusing/abusing his narcotic pain medications or using any illegal drugs.  There are No indicators for possible drug abuse, addiction or diversion problems. Upon obtaining the medical history from Mr. Chan regarding today's office visit for his presenting problems, Patient reports he has been doing okay. He says he's using Cymbalta along with Pamelor. [facility] He mentions he's managing some of the house chores. He complains he's having some increase pain in the neck. He reports he has a MRI of Cervical spine. He mentions he's having numbness in his arms.       ALLERGIES: Patients list of allergies were reviewed     MEDICATIONS: Mr. Chan list of medications were reviewed.His current medications are   Outpatient Medications Prior to Visit   Medication Sig

## 2024-04-08 NOTE — DISCHARGE INSTRUCTIONS
[facility]  Cardiovascular Special Procedures  Cervical Epidural Steroid Injection  Patient Discharge Instructions      When You Get Home    You should not drive the day of the procedure.  You may experience arm weakness during the first 24 hours following the procedure.  However, you do not need to stay in bed when you get home.  Even if you feel better right away, avoid activities that may strain your neck.    Keep in mind that some patients may feel increased pain for the first 24 hours.  You should start feeling some pain relief 3-7 days following the injection.  This is because the steroid will start working within three days of the injection with maximal effect by one week.  At that time, we will evaluate your pain level to determine the need for another steroid injection.    Remove bandaid(s) within 24 hours.      When to Call Your Doctor    Call right away if you notice any of the following symptoms:    Severe pain or headache;  Fever or chills;  Redness or swelling around the injection site.        You may contact [facility]. for any questions or problems that may occur at [phone] during the hours of 9am-4pm Monday-Friday, or the hospital  after hours at [phone], to have the interventional radiologist on call paged.        [facility]  Cardiovascular Special Procedures  General Discharge Instructions    ____ You may be drowsy or lightheaded after receiving sedation. DO NOT operate a vehicle (automobile, bicycle, motorcycle, machinery, or power tools), make any important decisions or sign any important/legal documents, or drink alcoholic beverages for the next 24 hours  _x___ We strongly suggest that a responsible adult be with you for the next 24 hours for your protection and safety  ____ If the intravenous catheter site is painful, apply warm wet compresses on the site until the soreness is relieved and elevate the arm above the heart.  Call your

## 2024-04-09 ENCOUNTER — HOSPITAL ENCOUNTER (OUTPATIENT)
Dept: INTERVENTIONAL RADIOLOGY/VASCULAR | Age: 79
Discharge: HOME | End: 2024-04-09
Payer: OTHER GOVERNMENT

## 2024-04-09 DIAGNOSIS — M54.12 CERVICAL RADICULITIS: ICD-10-CM

## 2024-04-09 PROCEDURE — 62321 NJX INTERLAMINAR CRV/THRC: CPT

## 2024-04-09 PROCEDURE — 2500000003 HC RX 250 WO HCPCS

## 2024-04-09 PROCEDURE — 6360000004 HC RX CONTRAST MEDICATION: Performed by: RADIOLOGY

## 2024-04-09 PROCEDURE — 2709999900 HC NON-CHARGEABLE SUPPLY

## 2024-04-09 PROCEDURE — 6360000002 HC RX W HCPCS

## 2024-04-09 RX ADMIN — IOHEXOL 10 ML: 180 INJECTION INTRAVENOUS at 10:48

## 2024-05-01 ENCOUNTER — OFFICE VISIT (OUTPATIENT)
Dept: PAIN MANAGEMENT | Age: 79
End: 2024-05-01
Payer: OTHER GOVERNMENT

## 2024-05-01 VITALS — OXYGEN SATURATION: 93 % | SYSTOLIC BLOOD PRESSURE: 116 MMHG | DIASTOLIC BLOOD PRESSURE: 76 MMHG | HEART RATE: 67 BPM

## 2024-05-01 DIAGNOSIS — M79.18 MYOFASCIAL PAIN: ICD-10-CM

## 2024-05-01 DIAGNOSIS — M54.12 CERVICAL RADICULITIS: ICD-10-CM

## 2024-05-01 DIAGNOSIS — G89.4 CHRONIC PAIN SYNDROME: ICD-10-CM

## 2024-05-01 DIAGNOSIS — M47.812 CERVICAL SPONDYLOSIS: ICD-10-CM

## 2024-05-01 DIAGNOSIS — M50.30 DDD (DEGENERATIVE DISC DISEASE), CERVICAL: ICD-10-CM

## 2024-05-01 PROCEDURE — 3074F SYST BP LT 130 MM HG: CPT | Performed by: INTERNAL MEDICINE

## 2024-05-01 PROCEDURE — 99213 OFFICE O/P EST LOW 20 MIN: CPT | Performed by: INTERNAL MEDICINE

## 2024-05-01 PROCEDURE — 3078F DIAST BP <80 MM HG: CPT | Performed by: INTERNAL MEDICINE

## 2024-05-01 PROCEDURE — 1123F ACP DISCUSS/DSCN MKR DOCD: CPT | Performed by: INTERNAL MEDICINE

## 2024-05-01 RX ORDER — NORTRIPTYLINE HYDROCHLORIDE 25 MG/1
25-50 CAPSULE ORAL NIGHTLY
Qty: 30 CAPSULE | Refills: 1 | Status: SHIPPED | OUTPATIENT
Start: 2024-05-01

## 2024-05-01 NOTE — PROGRESS NOTES
Bebeto Chan  1945  [id number]      HISTORY OF PRESENT ILLNESS:  Mr. Chan is a 78 y.o. male returns for a follow up visit for pain management  He has a diagnosis of   1. Chronic pain syndrome    2. Cervical radiculitis    3. DDD (degenerative disc disease), cervical    4. Myofascial pain    5. Cervical spondylosis    .      As per information/history obtained from the PADT(patient assessment and documentation tool) -  He complains of pain in the neck and upper back with radiation to the shoulders Bilateral He rates the pain 2/10 and describes it as numbness, pins and needles.  Pain is made worse by: movement, walking, standing, sitting, bending, lifting. He denies any side effects from the current pain regimen. Patient reports that since last follow up visit the physical functioning is better, family/social relationships are better, mood is better sleep patterns are better. Mr. Chan states that since starting the treatment with the current regimen the  overall functioning  in the above aspects is  better, Patient denies misusing/abusing his narcotic pain medications or using any illegal drugs.  There are No indicators for possible drug abuse, addiction or diversion problems. Upon obtaining the medical history from Mr. Chan regarding today's office visit for his presenting problems, patient states he has been doing fair, he is managing okay with the medications. Mr. Chan states he had an injection done on his neck, he states it did help with the pain. Patient says he was sick for 7-10 days. He mentions he is using Pamelor PRN, he is off Cymbalta now. Patient reports he is managing his house chores.       ALLERGIES: Patients list of allergies were reviewed     MEDICATIONS: Mr. Chan list of medications were reviewed.His current medications are   Outpatient Medications Prior to Visit   Medication Sig Dispense Refill    DULoxetine (CYMBALTA) 30 MG extended release capsule Take 1 capsule by mouth daily 30 capsule 1

## 2024-07-10 ENCOUNTER — OFFICE VISIT (OUTPATIENT)
Dept: PAIN MANAGEMENT | Age: 79
End: 2024-07-10
Payer: OTHER GOVERNMENT

## 2024-07-10 VITALS
WEIGHT: 283 LBS | HEART RATE: 77 BPM | SYSTOLIC BLOOD PRESSURE: 110 MMHG | BODY MASS INDEX: 44.32 KG/M2 | DIASTOLIC BLOOD PRESSURE: 68 MMHG | OXYGEN SATURATION: 94 %

## 2024-07-10 DIAGNOSIS — M54.12 CERVICAL RADICULITIS: ICD-10-CM

## 2024-07-10 DIAGNOSIS — M79.18 MYOFASCIAL PAIN: ICD-10-CM

## 2024-07-10 DIAGNOSIS — M50.30 DDD (DEGENERATIVE DISC DISEASE), CERVICAL: ICD-10-CM

## 2024-07-10 DIAGNOSIS — G89.4 CHRONIC PAIN SYNDROME: ICD-10-CM

## 2024-07-10 DIAGNOSIS — M47.812 CERVICAL SPONDYLOSIS: ICD-10-CM

## 2024-07-10 PROCEDURE — 3078F DIAST BP <80 MM HG: CPT | Performed by: INTERNAL MEDICINE

## 2024-07-10 PROCEDURE — 99213 OFFICE O/P EST LOW 20 MIN: CPT | Performed by: INTERNAL MEDICINE

## 2024-07-10 PROCEDURE — 3074F SYST BP LT 130 MM HG: CPT | Performed by: INTERNAL MEDICINE

## 2024-07-10 PROCEDURE — 1123F ACP DISCUSS/DSCN MKR DOCD: CPT | Performed by: INTERNAL MEDICINE

## 2024-07-10 RX ORDER — NORTRIPTYLINE HYDROCHLORIDE 25 MG/1
25-50 CAPSULE ORAL NIGHTLY
Qty: 30 CAPSULE | Refills: 1 | Status: SHIPPED | OUTPATIENT
Start: 2024-07-10

## 2024-07-10 RX ORDER — DULOXETIN HYDROCHLORIDE 30 MG/1
30 CAPSULE, DELAYED RELEASE ORAL DAILY
Qty: 30 CAPSULE | Refills: 1 | Status: SHIPPED | OUTPATIENT
Start: 2024-07-10

## 2024-07-10 NOTE — PROGRESS NOTES
Take 1 tablet by mouth daily (with breakfast)      insulin glargine (LANTUS) 100 UNIT/ML injection vial Inject 60 Units into the skin daily      pantoprazole (PROTONIX) 40 MG tablet Take 1 tablet by mouth daily      atorvastatin (LIPITOR) 80 MG tablet Take 1 tablet by mouth nightly      lisinopril (PRINIVIL;ZESTRIL) 20 MG tablet Take 1 tablet by mouth daily      polyvinyl alcohol (LIQUIFILM TEARS) 1.4 % ophthalmic solution Place 1 drop into both eyes as needed for Dry Eyes      cloNIDine (CATAPRES) 0.1 MG tablet Take 1 tablet by mouth 2 times daily      eplerenone (INSPRA) 50 MG tablet Take 1 tablet by mouth 2 times daily      gabapentin (NEURONTIN) 600 MG tablet Take 1 tablet by mouth 3 times daily.      Magnesium Oxide 420 MG TABS Take 2 tablets by mouth 2 times daily       ranolazine (RANEXA) 1000 MG extended release tablet Take 1 tablet by mouth 2 times daily      tamsulosin (FLOMAX) 0.4 MG capsule Take 1 capsule by mouth nightly       No current facility-administered medications for this visit.       General Goals of current treatment regimen include improvement in pain, restoration of functioning- with focus on improvement in physical performance, general activity, work or disability,emotional distress, health care utilization and  decreased medication consumption.   Will continue to monitor progress towards achieving/maintaining therapeutic goals with special emphasis on  1. Improvement in perceived interfernce  of pain with ADL's. Ability to do home exercises independently. Ability to do household chores indoor and/or outdoor work and social and leisure activities.Improve psychosocial and physical functioning. - he is maintaining his treatment goal with the current regimen.     He was advised against drinking alcohol with the narcotic pain medicines, advised against driving or handling machinery while adjusting the dose of medicines or if having cognitive  issues related to the current medications.Risk of

## 2025-01-03 ENCOUNTER — HOSPITAL ENCOUNTER (OUTPATIENT)
Dept: CARDIOLOGY | Age: 80
Discharge: HOME | End: 2025-01-03
Payer: OTHER GOVERNMENT

## 2025-01-03 VITALS
HEIGHT: 67 IN | BODY MASS INDEX: 44.42 KG/M2 | DIASTOLIC BLOOD PRESSURE: 68 MMHG | WEIGHT: 283 LBS | SYSTOLIC BLOOD PRESSURE: 110 MMHG

## 2025-01-03 DIAGNOSIS — I20.9 ANGOR PECTORIS (HCC): ICD-10-CM

## 2025-01-03 LAB
ECHO AO ASC DIAM: 3.1 CM
ECHO AO ASCENDING AORTA INDEX: 1.32 CM/M2
ECHO AO ROOT DIAM: 3.3 CM
ECHO AO ROOT INDEX: 1.41 CM/M2
ECHO AV AREA PEAK VELOCITY: 1.8 CM2
ECHO AV AREA VTI: 1.8 CM2
ECHO AV AREA/BSA PEAK VELOCITY: 0.8 CM2/M2
ECHO AV AREA/BSA VTI: 0.8 CM2/M2
ECHO AV CUSP MM: 1.5 CM
ECHO AV MEAN GRADIENT: 5 MMHG
ECHO AV MEAN VELOCITY: 1.1 M/S
ECHO AV PEAK GRADIENT: 10 MMHG
ECHO AV PEAK VELOCITY: 1.6 M/S
ECHO AV VELOCITY RATIO: 0.56
ECHO AV VTI: 30.7 CM
ECHO BSA: 2.46 M2
ECHO EST RA PRESSURE: 3 MMHG
ECHO LA AREA 2C: 18.4 CM2
ECHO LA AREA 4C: 15.3 CM2
ECHO LA MAJOR AXIS: 4.8 CM
ECHO LA MINOR AXIS: 5.4 CM
ECHO LA VOL BP: 48 ML (ref 18–58)
ECHO LA VOL MOD A2C: 52 ML (ref 18–58)
ECHO LA VOL MOD A4C: 39 ML (ref 18–58)
ECHO LA VOL/BSA BIPLANE: 21 ML/M2 (ref 16–34)
ECHO LA VOLUME INDEX MOD A2C: 22 ML/M2 (ref 16–34)
ECHO LA VOLUME INDEX MOD A4C: 17 ML/M2 (ref 16–34)
ECHO LV E' LATERAL VELOCITY: 4.19 CM/S
ECHO LV E' SEPTAL VELOCITY: 5.77 CM/S
ECHO LV EDV A2C: 104 ML
ECHO LV EDV A4C: 99 ML
ECHO LV EDV INDEX A4C: 42 ML/M2
ECHO LV EDV NDEX A2C: 44 ML/M2
ECHO LV EF PHYSICIAN: 48 %
ECHO LV EJECTION FRACTION A2C: 40 %
ECHO LV EJECTION FRACTION A4C: 36 %
ECHO LV EJECTION FRACTION BIPLANE: 37 % (ref 55–100)
ECHO LV ESV A2C: 62 ML
ECHO LV ESV A4C: 63 ML
ECHO LV ESV INDEX A2C: 26 ML/M2
ECHO LV ESV INDEX A4C: 27 ML/M2
ECHO LV FRACTIONAL SHORTENING: 28 % (ref 28–44)
ECHO LV INTERNAL DIMENSION DIASTOLE INDEX: 2.44 CM/M2
ECHO LV INTERNAL DIMENSION DIASTOLIC: 5.7 CM (ref 4.2–5.9)
ECHO LV INTERNAL DIMENSION SYSTOLIC INDEX: 1.75 CM/M2
ECHO LV INTERNAL DIMENSION SYSTOLIC: 4.1 CM
ECHO LV IVSD: 1.3 CM (ref 0.6–1)
ECHO LV MASS 2D: 288.7 G (ref 88–224)
ECHO LV MASS INDEX 2D: 123.4 G/M2 (ref 49–115)
ECHO LV POSTERIOR WALL DIASTOLIC: 1.1 CM (ref 0.6–1)
ECHO LV RELATIVE WALL THICKNESS RATIO: 0.39
ECHO LVOT AREA: 3.1 CM2
ECHO LVOT AV VTI INDEX: 0.58
ECHO LVOT DIAM: 2 CM
ECHO LVOT MEAN GRADIENT: 2 MMHG
ECHO LVOT PEAK GRADIENT: 3 MMHG
ECHO LVOT PEAK VELOCITY: 0.9 M/S
ECHO LVOT STROKE VOLUME INDEX: 24 ML/M2
ECHO LVOT SV: 56.2 ML
ECHO LVOT VTI: 17.9 CM
ECHO MV A VELOCITY: 1.03 M/S
ECHO MV AREA VTI: 1.7 CM2
ECHO MV E DECELERATION TIME (DT): 206 MS
ECHO MV E VELOCITY: 0.56 M/S
ECHO MV E/A RATIO: 0.54
ECHO MV E/E' LATERAL: 13.37
ECHO MV E/E' RATIO (AVERAGED): 11.54
ECHO MV E/E' SEPTAL: 9.71
ECHO MV LVOT VTI INDEX: 1.87
ECHO MV MAX VELOCITY: 1.2 M/S
ECHO MV MEAN GRADIENT: 2 MMHG
ECHO MV MEAN VELOCITY: 0.8 M/S
ECHO MV PEAK GRADIENT: 6 MMHG
ECHO MV VTI: 33.5 CM
ECHO PV MAX VELOCITY: 0.8 M/S
ECHO PV MEAN GRADIENT: 1 MMHG
ECHO PV MEAN VELOCITY: 0.6 M/S
ECHO PV PEAK GRADIENT: 3 MMHG
ECHO PV VTI: 14.4 CM
ECHO RA AREA 4C: 10.4 CM2
ECHO RA END SYSTOLIC VOLUME APICAL 4 CHAMBER INDEX BSA: 10 ML/M2
ECHO RA VOLUME: 23 ML
ECHO RIGHT VENTRICULAR SYSTOLIC PRESSURE (RVSP): 24 MMHG
ECHO RV INTERNAL DIMENSION: 3.1 CM
ECHO RV TAPSE: 1.8 CM (ref 1.7–?)
ECHO TV REGURGITANT MAX VELOCITY: 2.27 M/S
ECHO TV REGURGITANT PEAK GRADIENT: 21 MMHG

## 2025-01-03 PROCEDURE — 93306 TTE W/DOPPLER COMPLETE: CPT | Performed by: INTERNAL MEDICINE

## 2025-01-03 PROCEDURE — 93306 TTE W/DOPPLER COMPLETE: CPT
